# Patient Record
Sex: FEMALE | Race: WHITE | NOT HISPANIC OR LATINO | Employment: FULL TIME | URBAN - METROPOLITAN AREA
[De-identification: names, ages, dates, MRNs, and addresses within clinical notes are randomized per-mention and may not be internally consistent; named-entity substitution may affect disease eponyms.]

---

## 2019-09-20 ENCOUNTER — APPOINTMENT (OUTPATIENT)
Dept: GENERAL RADIOLOGY | Facility: HOSPITAL | Age: 29
End: 2019-09-20

## 2019-09-20 PROCEDURE — 74022 RADEX COMPL AQT ABD SERIES: CPT | Performed by: FAMILY MEDICINE

## 2019-09-21 ENCOUNTER — HOSPITAL ENCOUNTER (EMERGENCY)
Facility: HOSPITAL | Age: 29
Discharge: HOME | End: 2019-09-21
Attending: EMERGENCY MEDICINE | Admitting: EMERGENCY MEDICINE

## 2019-09-21 ENCOUNTER — APPOINTMENT (OUTPATIENT)
Dept: CT IMAGING | Facility: HOSPITAL | Age: 29
End: 2019-09-21

## 2019-09-21 VITALS
OXYGEN SATURATION: 100 % | BODY MASS INDEX: 23.05 KG/M2 | HEIGHT: 64 IN | RESPIRATION RATE: 16 BRPM | SYSTOLIC BLOOD PRESSURE: 114 MMHG | WEIGHT: 135 LBS | TEMPERATURE: 98.7 F | HEART RATE: 71 BPM | DIASTOLIC BLOOD PRESSURE: 75 MMHG

## 2019-09-21 DIAGNOSIS — R10.84 GENERALIZED ABDOMINAL PAIN: Primary | ICD-10-CM

## 2019-09-21 DIAGNOSIS — R19.7 NAUSEA VOMITING AND DIARRHEA: ICD-10-CM

## 2019-09-21 DIAGNOSIS — R11.2 NAUSEA VOMITING AND DIARRHEA: ICD-10-CM

## 2019-09-21 LAB
ALBUMIN SERPL-MCNC: 4 G/DL (ref 3.5–5.2)
ALBUMIN/GLOB SERPL: 1.4 G/DL
ALP SERPL-CCNC: 43 U/L (ref 39–117)
ALT SERPL W P-5'-P-CCNC: 31 U/L (ref 1–33)
AMYLASE SERPL-CCNC: 80 U/L (ref 28–100)
ANION GAP SERPL CALCULATED.3IONS-SCNC: 9.3 MMOL/L (ref 5–15)
AST SERPL-CCNC: 34 U/L (ref 1–32)
B-HCG UR QL: NEGATIVE
BASOPHILS # BLD AUTO: 0.03 10*3/MM3 (ref 0–0.2)
BASOPHILS NFR BLD AUTO: 0.5 % (ref 0–1.5)
BILIRUB SERPL-MCNC: 0.3 MG/DL (ref 0.2–1.2)
BILIRUB UR QL STRIP: NEGATIVE
BUN BLD-MCNC: 7 MG/DL (ref 6–20)
BUN/CREAT SERPL: 8.6 (ref 7–25)
CALCIUM SPEC-SCNC: 9.2 MG/DL (ref 8.6–10.5)
CHLORIDE SERPL-SCNC: 104 MMOL/L (ref 98–107)
CLARITY UR: CLEAR
CO2 SERPL-SCNC: 26.7 MMOL/L (ref 22–29)
COLOR UR: YELLOW
CREAT BLD-MCNC: 0.81 MG/DL (ref 0.57–1)
DEPRECATED RDW RBC AUTO: 42 FL (ref 37–54)
EOSINOPHIL # BLD AUTO: 0.11 10*3/MM3 (ref 0–0.4)
EOSINOPHIL NFR BLD AUTO: 1.9 % (ref 0.3–6.2)
ERYTHROCYTE [DISTWIDTH] IN BLOOD BY AUTOMATED COUNT: 12.6 % (ref 12.3–15.4)
GFR SERPL CREATININE-BSD FRML MDRD: 85 ML/MIN/1.73
GLOBULIN UR ELPH-MCNC: 2.9 GM/DL
GLUCOSE BLD-MCNC: 90 MG/DL (ref 65–99)
GLUCOSE UR STRIP-MCNC: NEGATIVE MG/DL
HCT VFR BLD AUTO: 39.1 % (ref 34–46.6)
HGB BLD-MCNC: 13.3 G/DL (ref 12–15.9)
HGB UR QL STRIP.AUTO: NEGATIVE
IMM GRANULOCYTES # BLD AUTO: 0.01 10*3/MM3 (ref 0–0.05)
IMM GRANULOCYTES NFR BLD AUTO: 0.2 % (ref 0–0.5)
KETONES UR QL STRIP: NEGATIVE
LEUKOCYTE ESTERASE UR QL STRIP.AUTO: NEGATIVE
LIPASE SERPL-CCNC: 79 U/L (ref 13–60)
LYMPHOCYTES # BLD AUTO: 2.88 10*3/MM3 (ref 0.7–3.1)
LYMPHOCYTES NFR BLD AUTO: 48.8 % (ref 19.6–45.3)
MCH RBC QN AUTO: 31.1 PG (ref 26.6–33)
MCHC RBC AUTO-ENTMCNC: 34 G/DL (ref 31.5–35.7)
MCV RBC AUTO: 91.4 FL (ref 79–97)
MONOCYTES # BLD AUTO: 0.39 10*3/MM3 (ref 0.1–0.9)
MONOCYTES NFR BLD AUTO: 6.6 % (ref 5–12)
NEUTROPHILS # BLD AUTO: 2.48 10*3/MM3 (ref 1.7–7)
NEUTROPHILS NFR BLD AUTO: 42 % (ref 42.7–76)
NITRITE UR QL STRIP: NEGATIVE
NRBC BLD AUTO-RTO: 0 /100 WBC (ref 0–0.2)
PH UR STRIP.AUTO: 7 [PH] (ref 4.5–8)
PLAT MORPH BLD: NORMAL
PLATELET # BLD AUTO: 295 10*3/MM3 (ref 140–450)
PMV BLD AUTO: 9.1 FL (ref 6–12)
POTASSIUM BLD-SCNC: 3.6 MMOL/L (ref 3.5–5.2)
PROT SERPL-MCNC: 6.9 G/DL (ref 6–8.5)
PROT UR QL STRIP: NEGATIVE
RBC # BLD AUTO: 4.28 10*6/MM3 (ref 3.77–5.28)
RBC MORPH BLD: NORMAL
SODIUM BLD-SCNC: 140 MMOL/L (ref 136–145)
SP GR UR STRIP: <=1.005 (ref 1–1.03)
UROBILINOGEN UR QL STRIP: NORMAL
WBC MORPH BLD: NORMAL
WBC NRBC COR # BLD: 5.9 10*3/MM3 (ref 3.4–10.8)

## 2019-09-21 PROCEDURE — 85025 COMPLETE CBC W/AUTO DIFF WBC: CPT | Performed by: EMERGENCY MEDICINE

## 2019-09-21 PROCEDURE — 81003 URINALYSIS AUTO W/O SCOPE: CPT | Performed by: EMERGENCY MEDICINE

## 2019-09-21 PROCEDURE — 85007 BL SMEAR W/DIFF WBC COUNT: CPT | Performed by: EMERGENCY MEDICINE

## 2019-09-21 PROCEDURE — 82150 ASSAY OF AMYLASE: CPT | Performed by: EMERGENCY MEDICINE

## 2019-09-21 PROCEDURE — 83690 ASSAY OF LIPASE: CPT | Performed by: EMERGENCY MEDICINE

## 2019-09-21 PROCEDURE — 81025 URINE PREGNANCY TEST: CPT | Performed by: EMERGENCY MEDICINE

## 2019-09-21 PROCEDURE — 80053 COMPREHEN METABOLIC PANEL: CPT | Performed by: EMERGENCY MEDICINE

## 2019-09-21 PROCEDURE — 99284 EMERGENCY DEPT VISIT MOD MDM: CPT | Performed by: EMERGENCY MEDICINE

## 2019-09-21 PROCEDURE — 74177 CT ABD & PELVIS W/CONTRAST: CPT

## 2019-09-21 PROCEDURE — 99283 EMERGENCY DEPT VISIT LOW MDM: CPT

## 2019-09-21 PROCEDURE — 0 IOPAMIDOL PER 1 ML: Performed by: EMERGENCY MEDICINE

## 2019-09-21 RX ORDER — SODIUM CHLORIDE 0.9 % (FLUSH) 0.9 %
10 SYRINGE (ML) INJECTION AS NEEDED
Status: DISCONTINUED | OUTPATIENT
Start: 2019-09-21 | End: 2019-09-22 | Stop reason: HOSPADM

## 2019-09-21 RX ADMIN — IOPAMIDOL 100 ML: 755 INJECTION, SOLUTION INTRAVENOUS at 21:43

## 2019-09-21 NOTE — ED NOTES
"The pt arrived to the ED ambulatory. Accompanied by her spouse. The pt reports that following meals she has n/v/d and abd pain. The pt reports the s/s have been ongoing x 1 week. The pt reports that she was seen at an urgent care and they told her she was \"full of poop\" The pt reports they prescribed her mag citrate but the pt reports she s/s persist.      Luz Jimenez RN  09/21/19 1942    "

## 2019-09-22 NOTE — ED PROVIDER NOTES
Subjective   Taryn Terry is a 30 yo WF who presents secondary abdominal pain, nausea and diarrhea x6 days.  Patient initially seen at urgent care center.  She was prescribed Zofran and Lomotil.  Patient reports no significant improvement with these medications.  She was seen at urgent care again yesterday.  X-rays were obtained which showed large amount of stool in the colon.  Patient was given a bottle of magnesium citrate.  This did not improve patient's symptoms.  She had several liquid stools.  Patient elected to come to the ER today for further evaluation.  Patient's pain appears to be exacerbated by eating.  Approximately 30 minutes after eating she will have increased pain, nausea and diarrhea.  No vomiting.  No fever.        History provided by:  Patient  Abdominal Pain   Pain location:  Generalized  Pain radiates to:  Does not radiate  Pain severity:  Moderate  Duration:  6 days  Timing:  Intermittent  Chronicity:  New  Context comment:  As described above.  Relieved by:  Nothing  Worsened by:  Eating  Associated symptoms: diarrhea and nausea    Associated symptoms: no chest pain, no constipation, no cough, no dysuria, no fever, no hematuria, no shortness of breath and no vomiting    Diarrhea:     Quality:  Watery    Duration:  6 days    Timing:  Intermittent  Nausea:     Severity:  Moderate  Risk factors: no alcohol abuse, has not had multiple surgeries, not obese, not pregnant and no recent hospitalization        Review of Systems   Constitutional: Negative.  Negative for fever.   HENT: Negative.  Negative for rhinorrhea.    Eyes: Negative.  Negative for redness.   Respiratory: Negative for cough and shortness of breath.    Cardiovascular: Negative for chest pain.   Gastrointestinal: Positive for abdominal pain, diarrhea and nausea. Negative for constipation and vomiting.   Endocrine: Negative.    Genitourinary: Negative.  Negative for difficulty urinating, dysuria and hematuria.   Musculoskeletal:  Negative.  Negative for back pain.   Skin: Negative.  Negative for color change.   Neurological: Negative.  Negative for syncope.   Hematological: Negative.    Psychiatric/Behavioral: Negative.    All other systems reviewed and are negative.      History reviewed. No pertinent past medical history.    No Known Allergies    History reviewed. No pertinent surgical history.    History reviewed. No pertinent family history.    Social History     Socioeconomic History   • Marital status:      Spouse name: Not on file   • Number of children: Not on file   • Years of education: Not on file   • Highest education level: Not on file   Tobacco Use   • Smoking status: Never Smoker   Substance and Sexual Activity   • Alcohol use: No   • Drug use: No   • Sexual activity: Defer           Objective   Physical Exam   Constitutional: She is oriented to person, place, and time. She appears well-developed and well-nourished. No distress.   29-year-old white female in the bed.  Patient appears in excellent overall health.  Vital signs are unremarkable.  Patient is friendly and cooperative.  Accompanied by her spouse.   HENT:   Head: Normocephalic and atraumatic.   Right Ear: External ear normal.   Left Ear: External ear normal.   Nose: Nose normal.   Mouth/Throat: Oropharynx is clear and moist.   Eyes: Conjunctivae and EOM are normal. Pupils are equal, round, and reactive to light.   Neck: Normal range of motion. Neck supple.   Cardiovascular: Normal rate, regular rhythm, normal heart sounds and intact distal pulses. Exam reveals no gallop and no friction rub.   No murmur heard.  Pulmonary/Chest: Effort normal and breath sounds normal.   Abdominal: Soft. Bowel sounds are normal. She exhibits no distension. There is no hepatosplenomegaly. There is tenderness (Minimal tenderness to palpation) in the right upper quadrant and epigastric area. There is no rigidity, no rebound, no guarding, no CVA tenderness, no tenderness at  McBurney's point and negative Yanez's sign. No hernia.   Musculoskeletal: Normal range of motion.   Neurological: She is alert and oriented to person, place, and time. She has normal reflexes.   Skin: Skin is warm and dry. She is not diaphoretic.   Psychiatric: She has a normal mood and affect. Her behavior is normal.   Nursing note and vitals reviewed.      Procedures           ED Course  ED Course as of Sep 22 0019   Sat Sep 21, 2019   1923 Patient's pain is right upper quadrant.  Pain and diarrhea began 30 minutes after eating.  Concerning for possible gallbladder etiology.  Obtain lab work and a CT with IV and oral contrast.  Patient declined offer for pain and nausea medicines at this time.  [SS]   2019 CBC and CMP are unremarkable.  Normal amylase.  Mildly elevated lipase.  Awaiting CT.  [SS]   2200 CT is unremarkable.  I find no source for patient's pain.  Will prescribe Bentyl for home.  Giving GI for follow-up.  [SS]   2223 I discussed with patient and spouse all results, diagnoses treatment and indications for follow-up.    Patient and spouse are going to [facility] in 2 weeks for vacation.  Discussed indications to be seen in [place] if symptoms worsen.  [SS]      ED Course User Index  [SS] Shyam Bunch MD      Labs Reviewed   COMPREHENSIVE METABOLIC PANEL - Abnormal; Notable for the following components:       Result Value    AST (SGOT) 34 (*)     All other components within normal limits    Narrative:     GFR Normal >60  Chronic Kidney Disease <60  Kidney Failure <15   LIPASE - Abnormal; Notable for the following components:    Lipase 79 (*)     All other components within normal limits   CBC WITH AUTO DIFFERENTIAL - Abnormal; Notable for the following components:    Neutrophil % 42.0 (*)     Lymphocyte % 48.8 (*)     All other components within normal limits   PREGNANCY, URINE - Normal   URINALYSIS W/ MICROSCOPIC IF INDICATED (NO CULTURE) - Normal    Narrative:     Urine microscopic not  indicated.   AMYLASE - Normal   SCAN SLIDE - Normal   CBC AND DIFFERENTIAL    Narrative:     The following orders were created for panel order CBC & Differential.  Procedure                               Abnormality         Status                     ---------                               -----------         ------                     CBC Auto Differential[[id number]]         Abnormal            Final result                 Please view results for these tests on the individual orders.     Xr Abdomen 2 View With Chest 1 View    Result Date: 9/20/2019  Narrative: TWO VIEWS ABDOMEN AND SINGLE VIEW CHEST  HISTORY: 29-year-old female with a history of abdominal pain with diarrhea for 1 week.  FINDINGS: Upright PA chest with supine and upright AP abdominal radiographs were obtained and demonstrate lungs that are normal in volume and are clear. The cardiomediastinal silhouette and pulmonary vasculature appear normal. Moderate retention of stool is seen throughout the colon, particularly within the descending colon.      Impression: There is moderate retention of stool in the colon as described. No evidence for an abnormality of the chest is appreciated.  This report was finalized on 9/20/2019 8:28 PM by Dr. Orion Guillermo M.D.      Ct Abdomen Pelvis With Contrast    Result Date: 9/21/2019  Narrative: CT Abdomen Pelvis W INDICATION: Right lower quadrant pain began one week ago TECHNIQUE: CT of the abdomen and pelvis with contrast. Coronal and sagittal reconstructions were obtained.  Radiation dose reduction techniques included automated exposure control or exposure modulation based on body size. Radiation audit for number of CT and nuclear cardiology exams performed in the last year: 0.  COMPARISON: None available. FINDINGS: Visualized lung bases are unremarkable. Abdomen: The liver and gallbladder and spleen and pancreas are normal. The kidneys and adrenal glands are normal. The aorta is normal in caliber. There is no  bowel obstruction, mass or adenopathy. Pelvis:  The appendix is normal. There is no pelvic mass or inflammatory change or abnormal fluid collection. There is no hernia or bowel obstruction. There is no acute bony abnormality.     Impression: Normal, negative CT of the abdomen and pelvis. Normal appendix. Signer Name: Naga Zarco MD  Signed: 9/21/2019 9:53 PM  Workstation Name: [facility]  [facility]    My differential diagnosis for abdominal pain includes but is not limited to:  Gastritis, gastroenteritis, peptic ulcer disease, GERD, esophageal perforation, acute appendicitis, mesenteric adenitis, Meckel’s diverticulum, epiploic appendagitis, diverticulitis, colon cancer, ulcerative colitis, Crohn’s disease, intussusception, small bowel obstruction, adhesions, ischemic bowel, perforated viscus, ileus, obstipation, biliary colic, cholecystitis, cholelithiasis, Jesus-Severiano Adonay, hepatitis, pancreatitis, common bile duct obstruction, cholangitis, bile leak, splenic trauma, splenic rupture, splenic infarction, splenic abscess, abdominal abscess, ascites, spontaneous bacterial peritonitis, hernia, UTI, cystitis, prostatitis, ureterolithiasis, urinary obstruction, ovarian cyst, torsion, pregnancy, ectopic pregnancy, PID, pelvic abscess, mittelschmerz, endometriosis, AAA, myocardial infarction, pneumonia, cancer, porphyria, DKA, medications, sickle cell, viral syndrome, zoster              MDM  Number of Diagnoses or Management Options  Generalized abdominal pain: new and requires workup  Nausea vomiting and diarrhea: new and requires workup     Amount and/or Complexity of Data Reviewed  Clinical lab tests: reviewed and ordered  Tests in the radiology section of CPT®: reviewed and ordered    Risk of Complications, Morbidity, and/or Mortality  Presenting problems: moderate  Diagnostic procedures: high  Management options: low    Patient Progress  Patient progress: improved      Final  diagnoses:   Generalized abdominal pain   Nausea vomiting and diarrhea              Shyam Bunch MD  09/22/19 0019

## 2019-09-22 NOTE — DISCHARGE INSTRUCTIONS
Continue current medications.  Follow-up with PCP or gastroenterology as above.  Recommend outpatient right upper quadrant ultrasound in the near future to evaluate your gallbladder further.  Return to ED for worsening symptoms, medical emergencies.

## 2019-09-23 ENCOUNTER — OFFICE VISIT (OUTPATIENT)
Dept: SURGERY | Facility: CLINIC | Age: 29
End: 2019-09-23

## 2019-09-23 VITALS
BODY MASS INDEX: 23.39 KG/M2 | HEIGHT: 64 IN | WEIGHT: 137 LBS | DIASTOLIC BLOOD PRESSURE: 60 MMHG | SYSTOLIC BLOOD PRESSURE: 106 MMHG

## 2019-09-23 DIAGNOSIS — R19.7 DIARRHEA, UNSPECIFIED TYPE: ICD-10-CM

## 2019-09-23 DIAGNOSIS — R10.11 RIGHT UPPER QUADRANT ABDOMINAL PAIN: Primary | ICD-10-CM

## 2019-09-23 PROCEDURE — 99202 OFFICE O/P NEW SF 15 MIN: CPT | Performed by: SURGERY

## 2019-09-23 NOTE — PROGRESS NOTES
PATIENT INFORMATION  Taryn Terry       - 1990    CHIEF COMPLAINT  Chief Complaint   Patient presents with   • Abdominal Pain       HISTORY OF PRESENT ILLNESS  HPI she complains of several week history of primarily mid to upper abdominal pain this is worse with eating.  No particular food sets it off.  After eating she has diarrhea as well.  She went to the urgent care center in the put her on an antiemetic and Imodium.  She went to the emergency room and had a CT scan of the abdomen and blood work.  She has not had any travel outside the country she has had an antibiotic for another reason within the last several months.  She is unsure if she is has any fevers or chills.  She says the diarrhea is light yellow        REVIEW OF SYSTEMS  Review of Systems otherwise negative      ACTIVE PROBLEMS  There are no active problems to display for this patient.        PAST MEDICAL HISTORY  History reviewed. No pertinent past medical history.      SURGICAL HISTORY  Past Surgical History:   Procedure Laterality Date   • BREAST SURGERY Left 2012    excision left breast mass   • SINUS SURGERY           FAMILY HISTORY  Family History   Problem Relation Age of Onset   • Heart disease Father    • Cancer Maternal Grandmother          SOCIAL HISTORY  Social History     Occupational History   • Not on file   Tobacco Use   • Smoking status: Never Smoker   Substance and Sexual Activity   • Alcohol use: No   • Drug use: No   • Sexual activity: Defer       Debilities/Disabilities Identified: None    Emotional Behavior: Appropriate    CURRENT MEDICATIONS    Current Outpatient Medications:   •  diphenoxylate-atropine (LOMOTIL) 2.5-0.025 MG per tablet, Take 1 tablet by mouth 4 (Four) Times a Day As Needed for Diarrhea., Disp: 20 tablet, Rfl: 0  •  norethindrone-ethinyl estradiol (FEMHRT /) 1-5 MG-MCG tablet, Take  by mouth daily., Disp: , Rfl:   •  ondansetron ODT (ZOFRAN-ODT) 8 MG disintegrating tablet, Take 1 tablet by mouth  "Every 8 (Eight) Hours As Needed for Nausea., Disp: 15 tablet, Rfl: 0  •  Prenatal Multivit-Min-Fe-FA (PRE- PO), Take  by mouth Daily., Disp: , Rfl:   •  valACYclovir (VALTREX) 1000 MG tablet, 1 po q8, Disp: 21 tablet, Rfl: 0    ALLERGIES  Patient has no known allergies.    VITALS  Vitals:    19 1555   BP: 106/60   Weight: 62.1 kg (137 lb)   Height: 162.6 cm (64\")       LAST RESULTS   Admission on 2019, Discharged on 2019   Component Date Value Ref Range Status   • Glucose 2019 90  65 - 99 mg/dL Final   • BUN 2019 7  6 - 20 mg/dL Final   • Creatinine 2019 0.81  0.57 - 1.00 mg/dL Final   • Sodium 2019 140  136 - 145 mmol/L Final   • Potassium 2019 3.6  3.5 - 5.2 mmol/L Final   • Chloride 2019 104  98 - 107 mmol/L Final   • CO2 2019 26.7  22.0 - 29.0 mmol/L Final   • Calcium 2019 9.2  8.6 - 10.5 mg/dL Final   • Total Protein 2019 6.9  6.0 - 8.5 g/dL Final   • Albumin 2019 4.00  3.50 - 5.20 g/dL Final   • ALT (SGPT) 2019 31  1 - 33 U/L Final   • AST (SGOT) 2019 34* 1 - 32 U/L Final   • Alkaline Phosphatase 2019 43  39 - 117 U/L Final   • Total Bilirubin 2019 0.3  0.2 - 1.2 mg/dL Final   • eGFR Non African Amer 2019 85  >60 mL/min/1.73 Final   • Globulin 2019 2.9  gm/dL Final   • A/G Ratio 2019 1.4  g/dL Final   • BUN/Creatinine Ratio 2019 8.6  7.0 - 25.0 Final   • Anion Gap 2019 9.3  5.0 - 15.0 mmol/L Final   • HCG, Urine QL 2019 Negative  Negative Final   • Color, UA 2019 Yellow  Yellow, Straw Final   • Appearance, UA 2019 Clear  Clear Final   • pH, UA 2019 7.0  4.5 - 8.0 Final   • Specific Gravity, UA 2019 <=1.005  1.003 - 1.030 Final   • Glucose, UA 2019 Negative  Negative Final   • Ketones, UA 2019 Negative  Negative Final   • Bilirubin, UA 2019 Negative  Negative Final   • Blood, UA 2019 Negative  Negative Final   • Protein, UA " 09/21/2019 Negative  Negative Final   • Leuk Esterase, UA 09/21/2019 Negative  Negative Final   • Nitrite, UA 09/21/2019 Negative  Negative Final   • Urobilinogen, UA 09/21/2019 0.2 E.U./dL  0.2 - 1.0 E.U./dL Final   • Lipase 09/21/2019 79* 13 - 60 U/L Final   • Amylase 09/21/2019 80  28 - 100 U/L Final   • WBC 09/21/2019 5.90  3.40 - 10.80 10*3/mm3 Final   • RBC 09/21/2019 4.28  3.77 - 5.28 10*6/mm3 Final   • Hemoglobin 09/21/2019 13.3  12.0 - 15.9 g/dL Final   • Hematocrit 09/21/2019 39.1  34.0 - 46.6 % Final   • MCV 09/21/2019 91.4  79.0 - 97.0 fL Final   • MCH 09/21/2019 31.1  26.6 - 33.0 pg Final   • MCHC 09/21/2019 34.0  31.5 - 35.7 g/dL Final   • RDW 09/21/2019 12.6  12.3 - 15.4 % Final   • RDW-SD 09/21/2019 42.0  37.0 - 54.0 fl Final   • MPV 09/21/2019 9.1  6.0 - 12.0 fL Final   • Platelets 09/21/2019 295  140 - 450 10*3/mm3 Final   • Neutrophil % 09/21/2019 42.0* 42.7 - 76.0 % Final   • Lymphocyte % 09/21/2019 48.8* 19.6 - 45.3 % Final   • Monocyte % 09/21/2019 6.6  5.0 - 12.0 % Final   • Eosinophil % 09/21/2019 1.9  0.3 - 6.2 % Final   • Basophil % 09/21/2019 0.5  0.0 - 1.5 % Final   • Immature Grans % 09/21/2019 0.2  0.0 - 0.5 % Final   • Neutrophils, Absolute 09/21/2019 2.48  1.70 - 7.00 10*3/mm3 Final   • Lymphocytes, Absolute 09/21/2019 2.88  0.70 - 3.10 10*3/mm3 Final   • Monocytes, Absolute 09/21/2019 0.39  0.10 - 0.90 10*3/mm3 Final   • Eosinophils, Absolute 09/21/2019 0.11  0.00 - 0.40 10*3/mm3 Final   • Basophils, Absolute 09/21/2019 0.03  0.00 - 0.20 10*3/mm3 Final   • Immature Grans, Absolute 09/21/2019 0.01  0.00 - 0.05 10*3/mm3 Final   • nRBC 09/21/2019 0.0  0.0 - 0.2 /100 WBC Final   • RBC Morphology 09/21/2019 Normal  Normal Final   • WBC Morphology 09/21/2019 Normal  Normal Final   • Platelet Morphology 09/21/2019 Normal  Normal Final     Xr Abdomen 2 View With Chest 1 View    Result Date: 9/20/2019  Narrative: TWO VIEWS ABDOMEN AND SINGLE VIEW CHEST  HISTORY: 29-year-old female with a  history of abdominal pain with diarrhea for 1 week.  FINDINGS: Upright PA chest with supine and upright AP abdominal radiographs were obtained and demonstrate lungs that are normal in volume and are clear. The cardiomediastinal silhouette and pulmonary vasculature appear normal. Moderate retention of stool is seen throughout the colon, particularly within the descending colon.      Impression: There is moderate retention of stool in the colon as described. No evidence for an abnormality of the chest is appreciated.  This report was finalized on 9/20/2019 8:28 PM by Dr. Orion Guillermo M.D.      Ct Abdomen Pelvis With Contrast    Result Date: 9/21/2019  Narrative: CT Abdomen Pelvis W INDICATION: Right lower quadrant pain began one week ago TECHNIQUE: CT of the abdomen and pelvis with contrast. Coronal and sagittal reconstructions were obtained.  Radiation dose reduction techniques included automated exposure control or exposure modulation based on body size. Radiation audit for number of CT and nuclear cardiology exams performed in the last year: 0.  COMPARISON: None available. FINDINGS: Visualized lung bases are unremarkable. Abdomen: The liver and gallbladder and spleen and pancreas are normal. The kidneys and adrenal glands are normal. The aorta is normal in caliber. There is no bowel obstruction, mass or adenopathy. Pelvis:  The appendix is normal. There is no pelvic mass or inflammatory change or abnormal fluid collection. There is no hernia or bowel obstruction. There is no acute bony abnormality.     Impression: Normal, negative CT of the abdomen and pelvis. Normal appendix. Signer Name: Naga Zarco MD  Signed: 9/21/2019 9:53 PM  Workstation Name: [facility]  [facility]      PHYSICAL EXAM  Physical Exam alert white female in no active distress she is oriented x3 her gait is normal.  Her abdomen is soft with some mild to subjective right upper quadrant tenderness.  There is no  abdominal mass.  I reviewed her CT scan films myself and it was essentially normal.  There was no thickening of the gallbladder there was no gallstones.  Her lipase was 79 but does not meet criteria for pancreatitis.  Her liver functions were essentially normal.  Viewed Dr. Bunch's note from the ER.    ASSESSMENT  Abdominal pain, diarrhea      PLAN  We will check a stool for C. difficile check an ultrasound of her gallbladder if the ultrasound is normal we will check a Kinevac stimulation HIDA scan.  I will see her back after those studies have been completed.  Of note she does have a trip planned to [place] in 2 weeks

## 2019-09-27 ENCOUNTER — HOSPITAL ENCOUNTER (OUTPATIENT)
Dept: ULTRASOUND IMAGING | Facility: HOSPITAL | Age: 29
Discharge: HOME | End: 2019-09-27
Admitting: SURGERY

## 2019-09-27 ENCOUNTER — HOSPITAL ENCOUNTER (OUTPATIENT)
Dept: NUCLEAR MEDICINE | Facility: HOSPITAL | Age: 29
Discharge: HOME | End: 2019-09-27

## 2019-09-27 DIAGNOSIS — R10.11 RIGHT UPPER QUADRANT ABDOMINAL PAIN: ICD-10-CM

## 2019-09-27 LAB — C DIFF GDH STL QL: NEGATIVE

## 2019-09-27 PROCEDURE — 87449 NOS EACH ORGANISM AG IA: CPT | Performed by: SURGERY

## 2019-09-27 PROCEDURE — 76705 ECHO EXAM OF ABDOMEN: CPT

## 2019-09-27 PROCEDURE — 87324 CLOSTRIDIUM AG IA: CPT | Performed by: SURGERY

## 2019-09-27 PROCEDURE — A9537 TC99M MEBROFENIN: HCPCS | Performed by: SURGERY

## 2019-09-27 PROCEDURE — 25010000002 SINCALIDE PER 5 MCG: Performed by: SURGERY

## 2019-09-27 PROCEDURE — 0 TECHNETIUM TC 99M MEBROFENIN KIT: Performed by: SURGERY

## 2019-09-27 PROCEDURE — 78227 HEPATOBIL SYST IMAGE W/DRUG: CPT

## 2019-09-27 RX ORDER — KIT FOR THE PREPARATION OF TECHNETIUM TC 99M MEBROFENIN 45 MG/10ML
1 INJECTION, POWDER, LYOPHILIZED, FOR SOLUTION INTRAVENOUS
Status: COMPLETED | OUTPATIENT
Start: 2019-09-27 | End: 2019-09-27

## 2019-09-27 RX ADMIN — MEBROFENIN 1 DOSE: 45 INJECTION, POWDER, LYOPHILIZED, FOR SOLUTION INTRAVENOUS at 14:05

## 2019-09-27 RX ADMIN — SINCALIDE 1.2 MCG: 5 INJECTION, POWDER, LYOPHILIZED, FOR SOLUTION INTRAVENOUS at 15:00

## 2019-09-30 ENCOUNTER — OFFICE VISIT (OUTPATIENT)
Dept: SURGERY | Facility: CLINIC | Age: 29
End: 2019-09-30

## 2019-09-30 VITALS
RESPIRATION RATE: 18 BRPM | BODY MASS INDEX: 23.22 KG/M2 | WEIGHT: 136 LBS | HEIGHT: 64 IN | DIASTOLIC BLOOD PRESSURE: 70 MMHG | SYSTOLIC BLOOD PRESSURE: 122 MMHG

## 2019-09-30 DIAGNOSIS — K80.20 CALCULUS OF GALLBLADDER WITHOUT CHOLECYSTITIS WITHOUT OBSTRUCTION: Primary | ICD-10-CM

## 2019-09-30 PROCEDURE — 99212 OFFICE O/P EST SF 10 MIN: CPT | Performed by: SURGERY

## 2019-09-30 NOTE — PROGRESS NOTES
PATIENT INFORMATION  Taryn Terry       - 1990    CHIEF COMPLAINT  Chief Complaint   Patient presents with   • Abdominal Pain   FU abdominal pain, U/Sand HIDA comp 19    HISTORY OF PRESENT ILLNESS  HPI she complains of ongoing right upper quadrant pain with nausea she says the diarrhea has improved she denies any vomiting.  She did have a fullness in her right upper quadrant when they did the injection for the Kinevac study.  She returns today for the results of her ultrasound and HIDA scan.        REVIEW OF SYSTEMS  Review of Systems her and her  are actively trying to conceive other systems were reviewed and are negative      ACTIVE PROBLEMS  There are no active problems to display for this patient.        PAST MEDICAL HISTORY  History reviewed. No pertinent past medical history.      SURGICAL HISTORY  Past Surgical History:   Procedure Laterality Date   • BREAST SURGERY Left 2012    excision left breast mass   • SINUS SURGERY           FAMILY HISTORY  Family History   Problem Relation Age of Onset   • Heart disease Father    • Cancer Maternal Grandmother          SOCIAL HISTORY  Social History     Occupational History   • Not on file   Tobacco Use   • Smoking status: Never Smoker   • Smokeless tobacco: Never Used   Substance and Sexual Activity   • Alcohol use: No   • Drug use: No   • Sexual activity: Defer       Debilities/Disabilities Identified: None    Emotional Behavior: Appropriate    CURRENT MEDICATIONS    Current Outpatient Medications:   •  diphenoxylate-atropine (LOMOTIL) 2.5-0.025 MG per tablet, Take 1 tablet by mouth 4 (Four) Times a Day As Needed for Diarrhea., Disp: 20 tablet, Rfl: 0  •  norethindrone-ethinyl estradiol (FEMHRT /) 1-5 MG-MCG tablet, Take  by mouth daily., Disp: , Rfl:   •  ondansetron ODT (ZOFRAN-ODT) 8 MG disintegrating tablet, Take 1 tablet by mouth Every 8 (Eight) Hours As Needed for Nausea., Disp: 15 tablet, Rfl: 0  •  Prenatal Multivit-Min-Fe-FA  "(PRE- PO), Take  by mouth Daily., Disp: , Rfl:   •  valACYclovir (VALTREX) 1000 MG tablet, 1 po q8, Disp: 21 tablet, Rfl: 0    ALLERGIES  Patient has no known allergies.    VITALS  Vitals:    19 1355   BP: 122/70   Resp: 18   Weight: 61.7 kg (136 lb)   Height: 162.6 cm (64\")       LAST RESULTS   Hospital Outpatient Visit on 2019   Component Date Value Ref Range Status   • C Diff GDH / Toxin 2019 Negative  Negative Final     Xr Abdomen 2 View With Chest 1 View    Result Date: 2019  Narrative: TWO VIEWS ABDOMEN AND SINGLE VIEW CHEST  HISTORY: 29-year-old female with a history of abdominal pain with diarrhea for 1 week.  FINDINGS: Upright PA chest with supine and upright AP abdominal radiographs were obtained and demonstrate lungs that are normal in volume and are clear. The cardiomediastinal silhouette and pulmonary vasculature appear normal. Moderate retention of stool is seen throughout the colon, particularly within the descending colon.      Impression: There is moderate retention of stool in the colon as described. No evidence for an abnormality of the chest is appreciated.  This report was finalized on 2019 8:28 PM by Dr. Orion Guillermo M.D.      Ct Abdomen Pelvis With Contrast    Result Date: 2019  Narrative: CT Abdomen Pelvis W INDICATION: Right lower quadrant pain began one week ago TECHNIQUE: CT of the abdomen and pelvis with contrast. Coronal and sagittal reconstructions were obtained.  Radiation dose reduction techniques included automated exposure control or exposure modulation based on body size. Radiation audit for number of CT and nuclear cardiology exams performed in the last year: 0.  COMPARISON: None available. FINDINGS: Visualized lung bases are unremarkable. Abdomen: The liver and gallbladder and spleen and pancreas are normal. The kidneys and adrenal glands are normal. The aorta is normal in caliber. There is no bowel obstruction, mass or adenopathy. Pelvis: "  The appendix is normal. There is no pelvic mass or inflammatory change or abnormal fluid collection. There is no hernia or bowel obstruction. There is no acute bony abnormality.     Impression: Normal, negative CT of the abdomen and pelvis. Normal appendix. Signer Name: Naga Zarco MD  Signed: 9/21/2019 9:53 PM  Workstation Name: [email or web address]  [facility] Gall[facility]    Result Date: 9/27/2019  Narrative: ULTRASOUND ABDOMEN, LIMITED, 09/27/2019  HISTORY: 29-year-old female with 2 week history right upper quadrant abdomen pain.  TECHNIQUE: Grayscale ultrasound imaging of the right upper quadrant was performed.  FINDINGS: The gallbladder is normal in ultrasound appearance with the exception of a small amount of biliary sludge within the dependent portion of the gallbladder. There is no visible cholelithiasis, gallbladder wall thickening or adjacent fluid collection. There is no intrahepatic or extra hepatic bile duct dilatation (CBD 3 mm).  The liver is normal in size and appearance. Visualized pancreas is negative. Right kidney is negative with no hydronephrosis.      Impression: Negative gallbladder ultrasound examination.  This report was finalized on 9/27/2019 2:17 PM by Dr. Flakito Schroeder MD.      Nm Hida Scan With Pharmacological Intervention    Result Date: 9/27/2019  Narrative: HEPATOBILIARY SCAN WITH GALLBLADDER EF MEASUREMENT, 09/27/2019  HISTORY: 29-year-old female complaining of 2 week history right upper quadrant abdomen pain, nausea and diarrhea. She notes symptoms are worse after meals. Negative gallbladder ultrasound examination today.  DOSE: *  5.5 mCi technetium labeled Choletec. *  1.22 mcg CCK infusion at 60 minutes.  COMPARISON: Ultrasound gallbladder, 09/27/2019.  FINDINGS: There is normal, prompt visualization of biliary activity within the gallbladder and bile ducts at 20 minutes, and there is increasing gallbladder and small bowel activity at 40 minutes  and 60 minutes. There is no evidence of cholecystitis or bile duct obstruction.  Normal gallbladder contraction is demonstrated during CCK infusion. Ejection fraction measures 87% (normal gallbladder EF measures greater than 35% using this protocol).      Impression: 1. Normal hepatobiliary scan. No evidence of cholecystitis or bile duct obstruction. 2. Normal gallbladder contraction with CCK. Ejection fraction measures 87%.  This report was finalized on 9/27/2019 3:34 PM by Dr. Flakito Schroeder MD.        PHYSICAL EXAM  Physical Exam alert white female in no active distress she does not have any clinical jaundice her heart shows a regular rate and rhythm her lungs are clear and equal her gait is normal she is oriented x3 she has subjective right upper quadrant tenderness without mass.  Her ultrasound shows microlithiasis.  Her Kinevac stimulation HIDA scan was normal.  I reviewed that study myself    ASSESSMENT  Cholelithiasis      PLAN  The risk benefits and options were discussed with the patient in detail.  She wishes to proceed with a laparoscopic cholecystectomy she will call back tomorrow to tell me when she would like to proceed.  She called back and wants to proceed on October 7

## 2019-09-30 NOTE — H&P
PATIENT INFORMATION  Taryn Terry       - 1990    CHIEF COMPLAINT  Chief Complaint   Patient presents with   • Abdominal Pain   FU abdominal pain, U/Sand HIDA comp 19    HISTORY OF PRESENT ILLNESS  HPI she complains of ongoing right upper quadrant pain with nausea she says the diarrhea has improved she denies any vomiting.  She did have a fullness in her right upper quadrant when they did the injection for the Kinevac study.  She returns today for the results of her ultrasound and HIDA scan.        REVIEW OF SYSTEMS  Review of Systems her and her  are actively trying to conceive other systems were reviewed and are negative      ACTIVE PROBLEMS  There are no active problems to display for this patient.        PAST MEDICAL HISTORY  History reviewed. No pertinent past medical history.      SURGICAL HISTORY  Past Surgical History:   Procedure Laterality Date   • BREAST SURGERY Left 2012    excision left breast mass   • SINUS SURGERY           FAMILY HISTORY  Family History   Problem Relation Age of Onset   • Heart disease Father    • Cancer Maternal Grandmother          SOCIAL HISTORY  Social History     Occupational History   • Not on file   Tobacco Use   • Smoking status: Never Smoker   • Smokeless tobacco: Never Used   Substance and Sexual Activity   • Alcohol use: No   • Drug use: No   • Sexual activity: Defer       Debilities/Disabilities Identified: None    Emotional Behavior: Appropriate    CURRENT MEDICATIONS    Current Outpatient Medications:   •  diphenoxylate-atropine (LOMOTIL) 2.5-0.025 MG per tablet, Take 1 tablet by mouth 4 (Four) Times a Day As Needed for Diarrhea., Disp: 20 tablet, Rfl: 0  •  norethindrone-ethinyl estradiol (FEMHRT /) 1-5 MG-MCG tablet, Take  by mouth daily., Disp: , Rfl:   •  ondansetron ODT (ZOFRAN-ODT) 8 MG disintegrating tablet, Take 1 tablet by mouth Every 8 (Eight) Hours As Needed for Nausea., Disp: 15 tablet, Rfl: 0  •  Prenatal Multivit-Min-Fe-FA  "(PRE- PO), Take  by mouth Daily., Disp: , Rfl:   •  valACYclovir (VALTREX) 1000 MG tablet, 1 po q8, Disp: 21 tablet, Rfl: 0    ALLERGIES  Patient has no known allergies.    VITALS  Vitals:    19 1355   BP: 122/70   Resp: 18   Weight: 61.7 kg (136 lb)   Height: 162.6 cm (64\")       LAST RESULTS   Hospital Outpatient Visit on 2019   Component Date Value Ref Range Status   • C Diff GDH / Toxin 2019 Negative  Negative Final     Xr Abdomen 2 View With Chest 1 View    Result Date: 2019  Narrative: TWO VIEWS ABDOMEN AND SINGLE VIEW CHEST  HISTORY: 29-year-old female with a history of abdominal pain with diarrhea for 1 week.  FINDINGS: Upright PA chest with supine and upright AP abdominal radiographs were obtained and demonstrate lungs that are normal in volume and are clear. The cardiomediastinal silhouette and pulmonary vasculature appear normal. Moderate retention of stool is seen throughout the colon, particularly within the descending colon.      Impression: There is moderate retention of stool in the colon as described. No evidence for an abnormality of the chest is appreciated.  This report was finalized on 2019 8:28 PM by Dr. Orion Guillermo M.D.      Ct Abdomen Pelvis With Contrast    Result Date: 2019  Narrative: CT Abdomen Pelvis W INDICATION: Right lower quadrant pain began one week ago TECHNIQUE: CT of the abdomen and pelvis with contrast. Coronal and sagittal reconstructions were obtained.  Radiation dose reduction techniques included automated exposure control or exposure modulation based on body size. Radiation audit for number of CT and nuclear cardiology exams performed in the last year: 0.  COMPARISON: None available. FINDINGS: Visualized lung bases are unremarkable. Abdomen: The liver and gallbladder and spleen and pancreas are normal. The kidneys and adrenal glands are normal. The aorta is normal in caliber. There is no bowel obstruction, mass or adenopathy. Pelvis: "  The appendix is normal. There is no pelvic mass or inflammatory change or abnormal fluid collection. There is no hernia or bowel obstruction. There is no acute bony abnormality.     Impression: Normal, negative CT of the abdomen and pelvis. Normal appendix. Signer Name: Naga Zarco MD  Signed: 9/21/2019 9:53 PM  Workstation Name: [id number]  [facility] Gall[facility]adder    Result Date: 9/27/2019  Narrative: ULTRASOUND ABDOMEN, LIMITED, 09/27/2019  HISTORY: 29-year-old female with 2 week history right upper quadrant abdomen pain.  TECHNIQUE: Grayscale ultrasound imaging of the right upper quadrant was performed.  FINDINGS: The gallbladder is normal in ultrasound appearance with the exception of a small amount of biliary sludge within the dependent portion of the gallbladder. There is no visible cholelithiasis, gallbladder wall thickening or adjacent fluid collection. There is no intrahepatic or extra hepatic bile duct dilatation (CBD 3 mm).  The liver is normal in size and appearance. Visualized pancreas is negative. Right kidney is negative with no hydronephrosis.      Impression: Negative gallbladder ultrasound examination.  This report was finalized on 9/27/2019 2:17 PM by Dr. Flakito Schroeder MD.      Nm Hida Scan With Pharmacological Intervention    Result Date: 9/27/2019  Narrative: HEPATOBILIARY SCAN WITH GALLBLADDER EF MEASUREMENT, 09/27/2019  HISTORY: 29-year-old female complaining of 2 week history right upper quadrant abdomen pain, nausea and diarrhea. She notes symptoms are worse after meals. Negative gallbladder ultrasound examination today.  DOSE: *  5.5 mCi technetium labeled Choletec. *  1.22 mcg CCK infusion at 60 minutes.  COMPARISON: Ultrasound gallbladder, 09/27/2019.  FINDINGS: There is normal, prompt visualization of biliary activity within the gallbladder and bile ducts at 20 minutes, and there is increasing gallbladder and small bowel activity at 40 minutes  and 60 minutes. There is no evidence of cholecystitis or bile duct obstruction.  Normal gallbladder contraction is demonstrated during CCK infusion. Ejection fraction measures 87% (normal gallbladder EF measures greater than 35% using this protocol).      Impression: 1. Normal hepatobiliary scan. No evidence of cholecystitis or bile duct obstruction. 2. Normal gallbladder contraction with CCK. Ejection fraction measures 87%.  This report was finalized on 9/27/2019 3:34 PM by Dr. Flakito Schroeder MD.        PHYSICAL EXAM  Physical Exam alert white female in no active distress she does not have any clinical jaundice her heart shows a regular rate and rhythm her lungs are clear and equal her gait is normal she is oriented x3 she has subjective right upper quadrant tenderness without mass.  Her ultrasound shows microlithiasis.  Her Kinevac stimulation HIDA scan was normal.  I reviewed that study myself    ASSESSMENT  Cholelithiasis      PLAN  The risk benefits and options were discussed with the patient in detail.  She wishes to proceed with a laparoscopic cholecystectomy she will call back tomorrow to tell me when she would like to proceed.  She called back and wants to proceed on October 7

## 2019-10-03 ENCOUNTER — LAB (OUTPATIENT)
Dept: LAB | Facility: HOSPITAL | Age: 29
End: 2019-10-03

## 2019-10-03 DIAGNOSIS — K80.20 CALCULUS OF GALLBLADDER WITHOUT CHOLECYSTITIS WITHOUT OBSTRUCTION: ICD-10-CM

## 2019-10-03 PROCEDURE — 84703 CHORIONIC GONADOTROPIN ASSAY: CPT

## 2019-10-03 PROCEDURE — 36415 COLL VENOUS BLD VENIPUNCTURE: CPT

## 2019-10-04 ENCOUNTER — ANESTHESIA EVENT (OUTPATIENT)
Dept: PERIOP | Facility: HOSPITAL | Age: 29
End: 2019-10-04

## 2019-10-04 LAB — HCG SERPL QL: NEGATIVE

## 2019-10-07 ENCOUNTER — ANESTHESIA (OUTPATIENT)
Dept: PERIOP | Facility: HOSPITAL | Age: 29
End: 2019-10-07

## 2019-10-07 ENCOUNTER — HOSPITAL ENCOUNTER (OUTPATIENT)
Facility: HOSPITAL | Age: 29
Setting detail: HOSPITAL OUTPATIENT SURGERY
Discharge: HOME | End: 2019-10-07
Attending: SURGERY | Admitting: SURGERY

## 2019-10-07 VITALS
SYSTOLIC BLOOD PRESSURE: 112 MMHG | DIASTOLIC BLOOD PRESSURE: 69 MMHG | TEMPERATURE: 97 F | WEIGHT: 137.6 LBS | RESPIRATION RATE: 14 BRPM | BODY MASS INDEX: 23.49 KG/M2 | HEART RATE: 62 BPM | OXYGEN SATURATION: 97 % | HEIGHT: 64 IN

## 2019-10-07 DIAGNOSIS — K80.20 CALCULUS OF GALLBLADDER WITHOUT CHOLECYSTITIS WITHOUT OBSTRUCTION: ICD-10-CM

## 2019-10-07 PROCEDURE — 25010000002 FENTANYL CITRATE (PF) 100 MCG/2ML SOLUTION: Performed by: NURSE ANESTHETIST, CERTIFIED REGISTERED

## 2019-10-07 PROCEDURE — 25010000002 ONDANSETRON PER 1 MG: Performed by: ANESTHESIOLOGY

## 2019-10-07 PROCEDURE — 25010000002 MIDAZOLAM PER 1 MG: Performed by: ANESTHESIOLOGY

## 2019-10-07 PROCEDURE — 25010000002 SUCCINYLCHOLINE PER 20 MG: Performed by: NURSE ANESTHETIST, CERTIFIED REGISTERED

## 2019-10-07 PROCEDURE — 25010000002 PROPOFOL 10 MG/ML EMULSION: Performed by: NURSE ANESTHETIST, CERTIFIED REGISTERED

## 2019-10-07 PROCEDURE — 25010000002 HYDROMORPHONE PER 4 MG: Performed by: NURSE ANESTHETIST, CERTIFIED REGISTERED

## 2019-10-07 PROCEDURE — 25010000003 CEFAZOLIN SODIUM-DEXTROSE 2-3 GM-%(50ML) RECONSTITUTED SOLUTION: Performed by: SURGERY

## 2019-10-07 PROCEDURE — 88304 TISSUE EXAM BY PATHOLOGIST: CPT | Performed by: SURGERY

## 2019-10-07 PROCEDURE — 47562 LAPAROSCOPIC CHOLECYSTECTOMY: CPT | Performed by: SURGERY

## 2019-10-07 PROCEDURE — 25010000002 NEOSTIGMINE 10 MG/10ML SOLUTION: Performed by: NURSE ANESTHETIST, CERTIFIED REGISTERED

## 2019-10-07 PROCEDURE — 25010000002 DEXAMETHASONE PER 1 MG: Performed by: ANESTHESIOLOGY

## 2019-10-07 PROCEDURE — 25010000002 ONDANSETRON PER 1 MG: Performed by: NURSE ANESTHETIST, CERTIFIED REGISTERED

## 2019-10-07 PROCEDURE — 25010000002 PROMETHAZINE PER 50 MG: Performed by: NURSE ANESTHETIST, CERTIFIED REGISTERED

## 2019-10-07 PROCEDURE — 25010000002 KETOROLAC TROMETHAMINE PER 15 MG: Performed by: NURSE ANESTHETIST, CERTIFIED REGISTERED

## 2019-10-07 RX ORDER — LIDOCAINE HYDROCHLORIDE 20 MG/ML
INJECTION, SOLUTION INFILTRATION; PERINEURAL AS NEEDED
Status: DISCONTINUED | OUTPATIENT
Start: 2019-10-07 | End: 2019-10-07 | Stop reason: SURG

## 2019-10-07 RX ORDER — MORPHINE SULFATE 10 MG/ML
2 INJECTION INTRAMUSCULAR; INTRAVENOUS; SUBCUTANEOUS
Status: DISCONTINUED | OUTPATIENT
Start: 2019-10-07 | End: 2019-10-07 | Stop reason: HOSPADM

## 2019-10-07 RX ORDER — SODIUM CHLORIDE 0.9 % (FLUSH) 0.9 %
1-10 SYRINGE (ML) INJECTION AS NEEDED
Status: DISCONTINUED | OUTPATIENT
Start: 2019-10-07 | End: 2019-10-07 | Stop reason: HOSPADM

## 2019-10-07 RX ORDER — DEXAMETHASONE SODIUM PHOSPHATE 4 MG/ML
8 INJECTION, SOLUTION INTRA-ARTICULAR; INTRALESIONAL; INTRAMUSCULAR; INTRAVENOUS; SOFT TISSUE ONCE
Status: COMPLETED | OUTPATIENT
Start: 2019-10-07 | End: 2019-10-07

## 2019-10-07 RX ORDER — FENTANYL CITRATE 50 UG/ML
INJECTION, SOLUTION INTRAMUSCULAR; INTRAVENOUS AS NEEDED
Status: DISCONTINUED | OUTPATIENT
Start: 2019-10-07 | End: 2019-10-07 | Stop reason: SURG

## 2019-10-07 RX ORDER — HYDROMORPHONE HYDROCHLORIDE 1 MG/ML
1 INJECTION, SOLUTION INTRAMUSCULAR; INTRAVENOUS; SUBCUTANEOUS
Status: DISCONTINUED | OUTPATIENT
Start: 2019-10-07 | End: 2019-10-07 | Stop reason: HOSPADM

## 2019-10-07 RX ORDER — SODIUM CHLORIDE, SODIUM LACTATE, POTASSIUM CHLORIDE, CALCIUM CHLORIDE 600; 310; 30; 20 MG/100ML; MG/100ML; MG/100ML; MG/100ML
9 INJECTION, SOLUTION INTRAVENOUS CONTINUOUS
Status: DISCONTINUED | OUTPATIENT
Start: 2019-10-07 | End: 2019-10-07 | Stop reason: HOSPADM

## 2019-10-07 RX ORDER — OXYCODONE HYDROCHLORIDE AND ACETAMINOPHEN 5; 325 MG/1; MG/1
1-2 TABLET ORAL EVERY 4 HOURS PRN
Qty: 30 TABLET | Refills: 0 | Status: SHIPPED | OUTPATIENT
Start: 2019-10-07 | End: 2019-10-14

## 2019-10-07 RX ORDER — CEFAZOLIN SODIUM 2 G/50ML
2 SOLUTION INTRAVENOUS ONCE
Status: COMPLETED | OUTPATIENT
Start: 2019-10-07 | End: 2019-10-07

## 2019-10-07 RX ORDER — PROPOFOL 10 MG/ML
VIAL (ML) INTRAVENOUS AS NEEDED
Status: DISCONTINUED | OUTPATIENT
Start: 2019-10-07 | End: 2019-10-07 | Stop reason: SURG

## 2019-10-07 RX ORDER — LIDOCAINE HYDROCHLORIDE 10 MG/ML
0.5 INJECTION, SOLUTION EPIDURAL; INFILTRATION; INTRACAUDAL; PERINEURAL ONCE AS NEEDED
Status: COMPLETED | OUTPATIENT
Start: 2019-10-07 | End: 2019-10-07

## 2019-10-07 RX ORDER — KETOROLAC TROMETHAMINE 30 MG/ML
INJECTION, SOLUTION INTRAMUSCULAR; INTRAVENOUS AS NEEDED
Status: DISCONTINUED | OUTPATIENT
Start: 2019-10-07 | End: 2019-10-07 | Stop reason: SURG

## 2019-10-07 RX ORDER — MIDAZOLAM HYDROCHLORIDE 1 MG/ML
2 INJECTION INTRAMUSCULAR; INTRAVENOUS
Status: DISCONTINUED | OUTPATIENT
Start: 2019-10-07 | End: 2019-10-07 | Stop reason: HOSPADM

## 2019-10-07 RX ORDER — MAGNESIUM HYDROXIDE 1200 MG/15ML
LIQUID ORAL AS NEEDED
Status: DISCONTINUED | OUTPATIENT
Start: 2019-10-07 | End: 2019-10-07 | Stop reason: HOSPADM

## 2019-10-07 RX ORDER — GLYCOPYRROLATE 0.2 MG/ML
INJECTION INTRAMUSCULAR; INTRAVENOUS AS NEEDED
Status: DISCONTINUED | OUTPATIENT
Start: 2019-10-07 | End: 2019-10-07 | Stop reason: SURG

## 2019-10-07 RX ORDER — SODIUM CHLORIDE, SODIUM LACTATE, POTASSIUM CHLORIDE, CALCIUM CHLORIDE 600; 310; 30; 20 MG/100ML; MG/100ML; MG/100ML; MG/100ML
100 INJECTION, SOLUTION INTRAVENOUS CONTINUOUS
Status: DISCONTINUED | OUTPATIENT
Start: 2019-10-07 | End: 2019-10-07 | Stop reason: HOSPADM

## 2019-10-07 RX ORDER — SCOLOPAMINE TRANSDERMAL SYSTEM 1 MG/1
1 PATCH, EXTENDED RELEASE TRANSDERMAL CONTINUOUS
Status: DISCONTINUED | OUTPATIENT
Start: 2019-10-07 | End: 2019-10-07 | Stop reason: HOSPADM

## 2019-10-07 RX ORDER — NEOSTIGMINE METHYLSULFATE 1 MG/ML
INJECTION, SOLUTION INTRAVENOUS AS NEEDED
Status: DISCONTINUED | OUTPATIENT
Start: 2019-10-07 | End: 2019-10-07 | Stop reason: SURG

## 2019-10-07 RX ORDER — MIDAZOLAM HYDROCHLORIDE 1 MG/ML
1 INJECTION INTRAMUSCULAR; INTRAVENOUS
Status: DISCONTINUED | OUTPATIENT
Start: 2019-10-07 | End: 2019-10-07 | Stop reason: HOSPADM

## 2019-10-07 RX ORDER — SUCCINYLCHOLINE CHLORIDE 20 MG/ML
INJECTION INTRAMUSCULAR; INTRAVENOUS AS NEEDED
Status: DISCONTINUED | OUTPATIENT
Start: 2019-10-07 | End: 2019-10-07 | Stop reason: SURG

## 2019-10-07 RX ORDER — ONDANSETRON 2 MG/ML
4 INJECTION INTRAMUSCULAR; INTRAVENOUS ONCE AS NEEDED
Status: COMPLETED | OUTPATIENT
Start: 2019-10-07 | End: 2019-10-07

## 2019-10-07 RX ORDER — ACETAMINOPHEN 500 MG
1000 TABLET ORAL ONCE
Status: COMPLETED | OUTPATIENT
Start: 2019-10-07 | End: 2019-10-07

## 2019-10-07 RX ORDER — PROMETHAZINE HYDROCHLORIDE 25 MG/1
25 TABLET ORAL EVERY 4 HOURS PRN
Qty: 20 TABLET | Refills: 0 | Status: SHIPPED | OUTPATIENT
Start: 2019-10-07 | End: 2019-11-03

## 2019-10-07 RX ORDER — FAMOTIDINE 20 MG/1
20 TABLET, FILM COATED ORAL
Status: COMPLETED | OUTPATIENT
Start: 2019-10-07 | End: 2019-10-07

## 2019-10-07 RX ORDER — SODIUM CHLORIDE 0.9 % (FLUSH) 0.9 %
3 SYRINGE (ML) INJECTION EVERY 12 HOURS SCHEDULED
Status: DISCONTINUED | OUTPATIENT
Start: 2019-10-07 | End: 2019-10-07 | Stop reason: HOSPADM

## 2019-10-07 RX ORDER — OXYCODONE HYDROCHLORIDE AND ACETAMINOPHEN 5; 325 MG/1; MG/1
1 TABLET ORAL ONCE AS NEEDED
Status: COMPLETED | OUTPATIENT
Start: 2019-10-07 | End: 2019-10-07

## 2019-10-07 RX ORDER — HYDROMORPHONE HCL 110MG/55ML
PATIENT CONTROLLED ANALGESIA SYRINGE INTRAVENOUS AS NEEDED
Status: DISCONTINUED | OUTPATIENT
Start: 2019-10-07 | End: 2019-10-07 | Stop reason: SURG

## 2019-10-07 RX ORDER — BUPIVACAINE HYDROCHLORIDE AND EPINEPHRINE 2.5; 5 MG/ML; UG/ML
INJECTION, SOLUTION INFILTRATION; PERINEURAL AS NEEDED
Status: DISCONTINUED | OUTPATIENT
Start: 2019-10-07 | End: 2019-10-07 | Stop reason: HOSPADM

## 2019-10-07 RX ORDER — SODIUM CHLORIDE 9 MG/ML
40 INJECTION, SOLUTION INTRAVENOUS AS NEEDED
Status: DISCONTINUED | OUTPATIENT
Start: 2019-10-07 | End: 2019-10-07 | Stop reason: HOSPADM

## 2019-10-07 RX ORDER — ROCURONIUM BROMIDE 10 MG/ML
INJECTION, SOLUTION INTRAVENOUS AS NEEDED
Status: DISCONTINUED | OUTPATIENT
Start: 2019-10-07 | End: 2019-10-07 | Stop reason: SURG

## 2019-10-07 RX ADMIN — SODIUM CHLORIDE, POTASSIUM CHLORIDE, SODIUM LACTATE AND CALCIUM CHLORIDE 9 ML/HR: 600; 310; 30; 20 INJECTION, SOLUTION INTRAVENOUS at 08:05

## 2019-10-07 RX ADMIN — HYDROMORPHONE HYDROCHLORIDE 1 MG: 1 INJECTION, SOLUTION INTRAMUSCULAR; INTRAVENOUS; SUBCUTANEOUS at 10:28

## 2019-10-07 RX ADMIN — ROCURONIUM BROMIDE 20 MG: 10 INJECTION INTRAVENOUS at 09:17

## 2019-10-07 RX ADMIN — FAMOTIDINE 20 MG: 10 INJECTION, SOLUTION INTRAVENOUS at 08:29

## 2019-10-07 RX ADMIN — GLYCOPYRROLATE 0.6 MG: 0.2 INJECTION INTRAMUSCULAR; INTRAVENOUS at 09:58

## 2019-10-07 RX ADMIN — PROPOFOL 180 MG: 10 INJECTION, EMULSION INTRAVENOUS at 09:09

## 2019-10-07 RX ADMIN — NEOSTIGMINE METHYLSULFATE 3.5 MG: 1 INJECTION INTRAVENOUS at 09:58

## 2019-10-07 RX ADMIN — SODIUM CHLORIDE, POTASSIUM CHLORIDE, SODIUM LACTATE AND CALCIUM CHLORIDE: 600; 310; 30; 20 INJECTION, SOLUTION INTRAVENOUS at 10:18

## 2019-10-07 RX ADMIN — FENTANYL CITRATE 50 MCG: 50 INJECTION, SOLUTION INTRAMUSCULAR; INTRAVENOUS at 09:08

## 2019-10-07 RX ADMIN — LIDOCAINE HYDROCHLORIDE 0.1 ML: 10 INJECTION, SOLUTION EPIDURAL; INFILTRATION; INTRACAUDAL; PERINEURAL at 08:05

## 2019-10-07 RX ADMIN — ONDANSETRON 4 MG: 2 INJECTION, SOLUTION INTRAMUSCULAR; INTRAVENOUS at 11:14

## 2019-10-07 RX ADMIN — ROCURONIUM BROMIDE 5 MG: 10 INJECTION INTRAVENOUS at 09:08

## 2019-10-07 RX ADMIN — FENTANYL CITRATE 50 MCG: 50 INJECTION, SOLUTION INTRAMUSCULAR; INTRAVENOUS at 09:18

## 2019-10-07 RX ADMIN — ACETAMINOPHEN 1000 MG: 500 TABLET, FILM COATED ORAL at 08:28

## 2019-10-07 RX ADMIN — SCOPALAMINE 1 PATCH: 1 PATCH, EXTENDED RELEASE TRANSDERMAL at 08:29

## 2019-10-07 RX ADMIN — MIDAZOLAM HYDROCHLORIDE 1 MG: 1 INJECTION, SOLUTION INTRAMUSCULAR; INTRAVENOUS at 08:29

## 2019-10-07 RX ADMIN — SUCCINYLCHOLINE CHLORIDE 150 MG: 20 INJECTION, SOLUTION INTRAMUSCULAR; INTRAVENOUS at 09:10

## 2019-10-07 RX ADMIN — KETOROLAC TROMETHAMINE 30 MG: 30 INJECTION INTRAMUSCULAR; INTRAVENOUS at 10:20

## 2019-10-07 RX ADMIN — HYDROMORPHONE HYDROCHLORIDE 0.2 MG: 2 INJECTION, SOLUTION INTRAMUSCULAR; INTRAVENOUS; SUBCUTANEOUS at 10:17

## 2019-10-07 RX ADMIN — CEFAZOLIN SODIUM 2 G: 2 SOLUTION INTRAVENOUS at 09:06

## 2019-10-07 RX ADMIN — PROMETHAZINE HYDROCHLORIDE 12.5 MG: 25 INJECTION INTRAMUSCULAR; INTRAVENOUS at 13:07

## 2019-10-07 RX ADMIN — OXYCODONE HYDROCHLORIDE AND ACETAMINOPHEN 1 TABLET: 5; 325 TABLET ORAL at 14:13

## 2019-10-07 RX ADMIN — DEXAMETHASONE SODIUM PHOSPHATE 8 MG: 4 INJECTION, SOLUTION INTRAMUSCULAR; INTRAVENOUS at 08:29

## 2019-10-07 RX ADMIN — HYDROMORPHONE HYDROCHLORIDE 0.4 MG: 2 INJECTION, SOLUTION INTRAMUSCULAR; INTRAVENOUS; SUBCUTANEOUS at 09:30

## 2019-10-07 RX ADMIN — HYDROMORPHONE HYDROCHLORIDE 0.2 MG: 2 INJECTION, SOLUTION INTRAMUSCULAR; INTRAVENOUS; SUBCUTANEOUS at 10:10

## 2019-10-07 RX ADMIN — LIDOCAINE HYDROCHLORIDE 100 MG: 20 INJECTION, SOLUTION INFILTRATION; PERINEURAL at 09:09

## 2019-10-07 RX ADMIN — ONDANSETRON 4 MG: 2 INJECTION, SOLUTION INTRAMUSCULAR; INTRAVENOUS at 08:29

## 2019-10-07 RX ADMIN — HYDROMORPHONE HYDROCHLORIDE 0.2 MG: 2 INJECTION, SOLUTION INTRAMUSCULAR; INTRAVENOUS; SUBCUTANEOUS at 09:41

## 2019-10-07 NOTE — ANESTHESIA PREPROCEDURE EVALUATION
Anesthesia Evaluation     Patient summary reviewed and Nursing notes reviewed   history of anesthetic complications (motion sick): PONV  NPO Solid Status: > 8 hours  NPO Liquid Status: > 2 hours           Airway   Mallampati: I  TM distance: >3 FB  Neck ROM: full  No difficulty expected  Dental      Comment: 2 crowns on molars    Pulmonary - negative pulmonary ROS and normal exam    breath sounds clear to auscultation  Cardiovascular - negative cardio ROS and normal exam  Exercise tolerance: good (4-7 METS)    Rhythm: regular  Rate: normal        Neuro/Psych  (+) headaches (migraines),     GI/Hepatic/Renal/Endo - negative ROS     Musculoskeletal (-) negative ROS    Abdominal  - normal exam   Substance History - negative use     OB/GYN negative ob/gyn ROS         Other - negative ROS       ROS/Med Hx Other: gatorade 0530                  Anesthesia Plan    ASA 1     general     intravenous induction   Anesthetic plan, all risks, benefits, and alternatives have been provided, discussed and informed consent has been obtained with: patient and mother.  Use of blood products discussed with patient  Consented to blood products.

## 2019-10-07 NOTE — ANESTHESIA PROCEDURE NOTES
Airway  Urgency: elective    Date/Time: 10/7/2019 9:11 AM  Airway not difficult    General Information and Staff    Patient location during procedure: OR  CRNA: Josselyn Umaña CRNA    Indications and Patient Condition  Indications for airway management: airway protection    Preoxygenated: yes  MILS maintained throughout  Mask difficulty assessment: 1 - vent by mask    Final Airway Details  Final airway type: endotracheal airway      Successful airway: ETT  Cuffed: yes   Successful intubation technique: direct laryngoscopy and RSI  Facilitating devices/methods: intubating stylet  Endotracheal tube insertion site: oral  Blade: Bañuelos  Blade size: 2  ETT size (mm): 7.0  Cormack-Lehane Classification: grade I - full view of glottis  Placement verified by: chest auscultation and capnometry   Cuff volume (mL): 6  Measured from: gums  ETT/EBT to gums (cm): 19  Number of attempts at approach: 1  Assessment: lips, teeth, and gum same as pre-op

## 2019-10-07 NOTE — OP NOTE
Preoperative diagnosis cholelithiasis  Postoperative diagnosis the same  Procedure laparoscopic cholecystectomy  Complications none  Drains none  Estimated blood loss 25 cc  Anesthesia General via endotracheal tube  Surgeon Dr. Fournier  Assistant Harris Lyn  Findings gallbladder wall edema  Procedure after satisfactory induction general anesthesia the endotracheal tube the patient's abdomen was prepped and draped in sterile fashion.  Transverse infra umbilical skin incision was made carried down through the skin and subcutaneous tissue.  Fascia was controlled medially and laterally tween 0 Ethibond stay sutures.  Fascia and peritoneum were divided.  Lakshmi trocar was placed within the abdomen.  General survey the abdomen after the abdomen was insufflated to 14 mmHg with use CO2 was essentially normal.  5 mm ports x3 were placed one in the epigastrium 2 in the right upper quadrant under direct vision after each site had been locally infiltrated quarter percent Marcaine with epinephrine.  Gallbladder was grasped next the gallbladder was dissected out at its junction with the cystic duct.  Cystic duct was dissected out cystic artery was dissected out.  Cystic duct was clipped x3 and divided leaving 2 clips on the cystic duct stump.  Cystic artery was clipped x3 and divided leaving 2 clips on the cystic artery stump.  Several lymphatics were hemoclipped as well.  Gallbladder was sequentially taken of the liver bed with use electrocautery.  Hemostasis was assured in the liver bed with electrocautery.  Gallbladder was dropped in Endo Catch bag removed from the abdomen was inspected found to be divided at the neck the gallbladder cystic duct junction.  Right upper quadrant is again visualized all clips were intact site was liberally irrigated hemostasis was assured.  5 mm ports were sequentially pulled back all were hemostatic.  Abdomen was desufflated Lakshmi trocar was removed.  Infra umbilical fascia was closed in  interrupted simple fashion with use of 0 Ethibond placing all sutures completion.  Skin was closed with 4-0 Monocryl running subcuticular stitch burying the knots.  Wounds were clean and dry and sterilely dressed.  The patient tolerated procedure well was transferred to the recovery room in stable condition.  When she is awake alert stable tolerating p.o. and has voided she will be discharged home to follow-up in my office next week call for problems.  Diet as tolerated.  No lifting more than 5 pounds x 6 weeks.  She may shower in the a.m.  She was written a prescription for Percocet 5/325 1-2 p.o. every 4 hours as needed pain 30 these were dispensed without refills.  Phenergan 25 mg p.o. every 4 hours as needed nausea 20 these were dispensed without refills.  She should call for problems and call for an appointment

## 2019-10-07 NOTE — ANESTHESIA POSTPROCEDURE EVALUATION
Patient: Taryn Terry    Procedure Summary     Date:  10/07/19 Room / Location:  [facility] OR 1 /  [facility] OR    Anesthesia Start:  0902 Anesthesia Stop:  1021    Procedure:  CHOLECYSTECTOMY LAPAROSCOPIC (N/A Abdomen) Diagnosis:       Calculus of gallbladder without cholecystitis without obstruction      (Calculus of gallbladder without cholecystitis without obstruction [K80.20])    Surgeon:  Naga Fournier MD Provider:  Josselyn Umaña CRNA    Anesthesia Type:  general ASA Status:  1          Anesthesia Type: general  Last vitals  BP   107/71 (10/07/19 1410)   Temp   97 °F (36.1 °C) (10/07/19 1045)   Pulse   76 (10/07/19 1410)   Resp   16 (10/07/19 1410)     SpO2   97 % (10/07/19 1410)     Post Anesthesia Care and Evaluation    Patient location during evaluation: PHASE II  Patient participation: complete - patient participated  Level of consciousness: awake and alert  Pain score: 3  Pain management: satisfactory to patient  Airway patency: patent  Anesthetic complications: No anesthetic complications  PONV Status: controlled  Cardiovascular status: acceptable  Respiratory status: acceptable  Hydration status: acceptable

## 2019-10-07 NOTE — INTERVAL H&P NOTE
"  H&P updated. The patient was examined and the following changes are noted:  vs  /89 (BP Location: Left arm)   Pulse 69   Temp 98.4 °F (36.9 °C) (Oral)   Resp 12   Ht 162.6 cm (64\")   Wt 62.4 kg (137 lb 9.6 oz)   LMP 07/15/2019 (Approximate)   SpO2 100%   BMI 23.62 kg/m²         "

## 2019-10-08 ENCOUNTER — TELEPHONE (OUTPATIENT)
Dept: SURGERY | Facility: CLINIC | Age: 29
End: 2019-10-08

## 2019-10-08 NOTE — TELEPHONE ENCOUNTER
Patient would like to know when she can return to work and when she will be able to resume light exercise? Such and walking or jogging.

## 2019-10-09 LAB
CYTO UR: NORMAL
LAB AP CASE REPORT: NORMAL
PATH REPORT.FINAL DX SPEC: NORMAL
PATH REPORT.GROSS SPEC: NORMAL

## 2019-10-14 ENCOUNTER — OFFICE VISIT (OUTPATIENT)
Dept: SURGERY | Facility: CLINIC | Age: 29
End: 2019-10-14

## 2019-10-14 DIAGNOSIS — Z09 SURGICAL FOLLOW-UP CARE: Primary | ICD-10-CM

## 2019-10-14 PROCEDURE — 99024 POSTOP FOLLOW-UP VISIT: CPT | Performed by: SURGERY

## 2019-10-14 NOTE — PROGRESS NOTES
1 WK PO lap elicia - states she is still weak  She is without complaints except fine rash over her abdominal wall and left sided rash along her rib cage.  Fine rash is likely secondary to the ChloraPrep her incisions are healing well she does not have any clinical jaundice her pathology was discussed.  There is no impulse I will see her back in 1 month

## 2019-11-11 ENCOUNTER — OFFICE VISIT (OUTPATIENT)
Dept: SURGERY | Facility: CLINIC | Age: 29
End: 2019-11-11

## 2019-11-11 DIAGNOSIS — Z09 SURGICAL FOLLOW-UP CARE: Primary | ICD-10-CM

## 2019-11-11 PROCEDURE — 99024 POSTOP FOLLOW-UP VISIT: CPT | Performed by: SURGERY

## 2019-11-11 RX ORDER — VALACYCLOVIR HYDROCHLORIDE 500 MG/1
500 TABLET, FILM COATED ORAL DAILY
COMMUNITY
End: 2021-04-16

## 2019-11-11 NOTE — PROGRESS NOTES
5 wk PO lap elicia - no problems at this time  She is without complaints.  She does not have any clinical jaundice her incisions are healing well there is no impulse.  In 1 week she can resume normal activities and I will see her back PRN

## 2021-05-03 ENCOUNTER — OFFICE VISIT (OUTPATIENT)
Dept: INTERNAL MEDICINE | Facility: CLINIC | Age: 31
End: 2021-05-03

## 2021-05-03 VITALS
TEMPERATURE: 97.8 F | OXYGEN SATURATION: 98 % | HEART RATE: 102 BPM | SYSTOLIC BLOOD PRESSURE: 120 MMHG | BODY MASS INDEX: 22.66 KG/M2 | HEIGHT: 64 IN | DIASTOLIC BLOOD PRESSURE: 76 MMHG

## 2021-05-03 DIAGNOSIS — G43.009 MIGRAINE WITHOUT AURA AND WITHOUT STATUS MIGRAINOSUS, NOT INTRACTABLE: ICD-10-CM

## 2021-05-03 DIAGNOSIS — A05.9 FOODBORNE GASTROENTERITIS: ICD-10-CM

## 2021-05-03 DIAGNOSIS — F41.1 GENERALIZED ANXIETY DISORDER: ICD-10-CM

## 2021-05-03 DIAGNOSIS — Z00.00 ANNUAL PHYSICAL EXAM: Primary | ICD-10-CM

## 2021-05-03 PROBLEM — K80.20 CALCULUS OF GALLBLADDER WITHOUT CHOLECYSTITIS WITHOUT OBSTRUCTION: Status: RESOLVED | Noted: 2019-09-30 | Resolved: 2021-05-03

## 2021-05-03 PROBLEM — O14.90 PREECLAMPSIA: Status: ACTIVE | Noted: 2021-05-03

## 2021-05-03 PROCEDURE — 99395 PREV VISIT EST AGE 18-39: CPT | Performed by: FAMILY MEDICINE

## 2021-05-03 RX ORDER — SERTRALINE HYDROCHLORIDE 100 MG/1
100 TABLET, FILM COATED ORAL DAILY
Qty: 90 TABLET | Refills: 3 | Status: SHIPPED | OUTPATIENT
Start: 2021-05-03 | End: 2022-05-06 | Stop reason: SDUPTHER

## 2021-05-03 RX ORDER — ACETAMINOPHEN AND CODEINE PHOSPHATE 120; 12 MG/5ML; MG/5ML
0.35 SOLUTION ORAL DAILY
COMMUNITY
Start: 2020-11-13 | End: 2021-05-03

## 2021-05-03 NOTE — PROGRESS NOTES
Date of Encounter: 2021  Patient: Taryn Terry,  1990    Subjective   History of Presenting Illness  Chief complaint: Annual physical    Recent gastroenteritis with nausea, vomiting, diarrhea and both her and her  who ate the same shrimp.  Symptoms lasted approximately 2 days, now have almost completely resolved.  No other symptoms    Generalized anxiety disorder controlled with sertraline.  Recent dose change in context of pregnancy but would like to resume sertraline 100 mg which she has benefited from very well previously.    Migraines without auras, currently managed with Excedrin Migraine.    History of postpartum preeclampsia, normotensive in office today and at urgent care recently.    Lives with , 7-month-old son.  Sexually active, using barrier protection and not interested in contraception at this time.  Breast-feeding and pumping, with no breakthrough bleeding.  Never smoker, does not drink alcohol.  Exercising 3-4 times per week.  Healthy diet.  Cervical cancer screening 2020 with no history of abnormal Pap smears.  Works as a .  Does not have a living will.  Up-to-date on COVID-19, Tdap vaccinations.    Review of Systems:  Negative for fever, congestion, chest pain upon exertion, shortness of breath, vision changes, dysuria, lymphadenopathy, muscle weakness, numbness, mood changes, rashes.    Positive for vomiting, resolving    The following portions of the patient's history were reviewed and updated as appropriate: allergies, current medications, past family history, past medical history, past social history, past surgical history and problem list.    Patient Active Problem List   Diagnosis   • History of preeclampsia   • Migraine without aura and without status migrainosus, not intractable   • Generalized anxiety disorder     Past Medical History:   Diagnosis Date   • Calculus of gallbladder without cholecystitis without obstruction 2019    sched ava rubi  "  • Depression affecting pregnancy    • History of epistaxis     had to have cauterized in college   • Hypotension    • Migraines    • PONV (postoperative nausea and vomiting)      Past Surgical History:   Procedure Laterality Date   • BREAST SURGERY Left 2012    excision left breast mass   • CAUTERIZATION NASAL BLEEDERS      in college d/t frequent nose bleeds, thinks 2012   • CHOLECYSTECTOMY N/A 10/7/2019    Procedure: CHOLECYSTECTOMY LAPAROSCOPIC;  Surgeon: Naga Fournier MD;  Location: [facility];  Service: General     Family History   Problem Relation Age of Onset   • Heart disease Father    • Heart attack Father    • Diabetes Father    • Hypertension Father    • Breast cancer Maternal Grandmother    • Colon cancer Maternal Grandmother    • Endometrial cancer Maternal Grandmother    • Diverticulitis Mother    • Cancer Maternal Grandfather    • Cancer Paternal Grandfather    • Malig Hyperthermia Neg Hx        Current Outpatient Medications:   •  Prenatal Vit-DSS-Fe Cbn-FA (PRENATAL AD PO), Take  by mouth Daily., Disp: , Rfl:   •  sertraline (ZOLOFT) 100 MG tablet, Take 1 tablet by mouth Daily., Disp: 90 tablet, Rfl: 3  No Known Allergies  Social History     Tobacco Use   • Smoking status: Never Smoker   • Smokeless tobacco: Never Used   Substance Use Topics   • Alcohol use: No   • Drug use: No          Objective   Physical Exam  Vitals:    05/03/21 1331   BP: 120/76   Pulse: 102   Temp: 97.8 °F (36.6 °C)   TempSrc: Temporal   SpO2: 98%   Height: 162.6 cm (64\")     Body mass index is 22.66 kg/m².    Constitutional: NAD.  Eyes: EOMI. PERRLA. Normal conjunctiva.  Ear, nose, mouth, throat: No tonsillar exudates or erythema.   Normal nasal mucosa. Normal external ear canals and TMs bilaterally.  Cardiovascular: RRR. No murmurs. No LE edema b/l. Radial pulses 2+ bilaterally.  Pulmonary: CTA b/l. Good effort.  Integumentary: No rashes or wounds on face or upper extremities.  Lymphatic: No anterior cervical " lymphadenopathy.  Endocrine: No thyromegaly or palpable thyroid nodules.  Psychiatric: Normal affect. Normal thought content.  Gastrointestinal: Nondistended. No hepatosplenomegaly. No focal tenderness to palpation. Normal bowel sounds.     Assessment/Plan   Assessment and Plan  Pleasant 31-year-old female with generalized anxiety disorder, migraines without aura, who presents with the following:    Diagnoses and all orders for this visit:    1. Annual physical exam (Primary): We discussed preventative care including age and patient-appropriate immunizations and cancer screening. We also discussed the importance of exercise and a healthy diet. This is their annual preventative exam.    2. Migraine without aura and without status migrainosus, not intractable: Stable    3. Generalized anxiety disorder: Okay to increase sertraline to 100 mg, patient understands risk, although minimal, of breast-feeding on this medication but her infant is 7 months old with no underlying health problems  -     sertraline (ZOLOFT) 100 MG tablet; Take 1 tablet by mouth Daily.  Dispense: 90 tablet; Refill: 3    4. Foodborne gastroenteritis: Resolved    Overall doing very well, I have no concerns. Return to office in 1 year for annual physical or earlier as needed.    Jamil Calderon MD  Family Medicine  O: [phone]  C: [phone]    Disclaimer: Parts of this note were dictated by speech recognition. Minor errors in transcription may be present. Please call if questions.

## 2021-05-13 ENCOUNTER — TELEPHONE (OUTPATIENT)
Dept: INTERNAL MEDICINE | Facility: CLINIC | Age: 31
End: 2021-05-13

## 2021-05-13 NOTE — TELEPHONE ENCOUNTER
Caller: Taryn Terry    Relationship to patient: Self    Best call back number: [phone]     Patient is needing: PATIENT HAS COME DOWN WITH COLDS AND IS BREASTFEEDING AND WANTS TO KNOW THE BEST MEDICATIONS SHE BOTH CAN AND CANT TAKE WHILE BREASTFEEDING . PLEASE CALL PATIENT AND ADVISE .

## 2021-05-14 NOTE — TELEPHONE ENCOUNTER
Tylenol, ibuprofen are okay.  Since her infant is about 7 months old she can also use over-the-counter antihistamines for short courses.  Intranasal steroids like Flonase or Nasacort are safe as well. I do not recommend any medications with ephedrine or pseudoephedrine components, or chlorpheniramine.

## 2021-08-03 ENCOUNTER — OFFICE VISIT (OUTPATIENT)
Dept: INTERNAL MEDICINE | Facility: CLINIC | Age: 31
End: 2021-08-03

## 2021-08-03 VITALS
SYSTOLIC BLOOD PRESSURE: 118 MMHG | DIASTOLIC BLOOD PRESSURE: 62 MMHG | HEART RATE: 94 BPM | OXYGEN SATURATION: 97 % | TEMPERATURE: 98.2 F

## 2021-08-03 DIAGNOSIS — J02.9 PHARYNGITIS, UNSPECIFIED ETIOLOGY: Primary | ICD-10-CM

## 2021-08-03 LAB
EXPIRATION DATE: NORMAL
INTERNAL CONTROL: NORMAL
Lab: NORMAL
S PYO AG THROAT QL: NEGATIVE

## 2021-08-03 PROCEDURE — 99213 OFFICE O/P EST LOW 20 MIN: CPT | Performed by: FAMILY MEDICINE

## 2021-08-03 PROCEDURE — 87880 STREP A ASSAY W/OPTIC: CPT | Performed by: FAMILY MEDICINE

## 2021-08-03 NOTE — PROGRESS NOTES
Subjective   Taryn Terry is a 31 y.o. female.   CC: sore throat  History of Present Illness   Taryn is a 31 year old female who comes in today with the complaint of feeling ill since yesterday.  She spent last week at [facility] and felt fine.  On the drive home yesterday she started feeling bad with sore throat and congestion.  She was very careful while she was gone.  Wore a mask and used hand  and did not go out except to the beach.  She has had her COVID shots.  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  No one else has any symptoms.  She still has sense of taste and smell but it has lessened.  She has nasal drainage which is yellowish.  No cough or fever.  She called her pediatrician's office and was told that she should be checked for COVID.      The following portions of the patient's history were reviewed and updated as appropriate: allergies, current medications, past medical history, past social history and problem list.    Review of Systems   Constitutional: Positive for fatigue. Negative for appetite change, chills, diaphoresis and fever.   HENT: Positive for congestion, ear pain, postnasal drip, rhinorrhea, sinus pain and sore throat.    Respiratory: Negative for cough and shortness of breath.    Cardiovascular: Negative for chest pain and palpitations.   Gastrointestinal: Negative for diarrhea and nausea.   Skin: Negative for rash.       Objective   Physical Exam  Vitals and nursing note reviewed.   Constitutional:       Appearance: Normal appearance.   HENT:      Head: Normocephalic and atraumatic.      Right Ear: Tympanic membrane, ear canal and external ear normal.      Left Ear: Tympanic membrane, ear canal and external ear normal.      Mouth/Throat:      Mouth: Mucous membranes are moist.      Pharynx: Oropharynx is clear. Posterior oropharyngeal erythema present. No oropharyngeal exudate.   Cardiovascular:      Rate and Rhythm: Normal rate and regular rhythm.      Heart sounds: Normal heart sounds.    Pulmonary:      Effort: Pulmonary effort is normal.      Breath sounds: Normal breath sounds.   Lymphadenopathy:      Cervical: Cervical adenopathy present.   Skin:     General: Skin is warm and dry.      Findings: No rash.   Neurological:      General: No focal deficit present.      Mental Status: She is alert and oriented to person, place, and time.   Psychiatric:         Mood and Affect: Mood normal.         Behavior: Behavior normal.       Vitals:    08/03/21 1300   BP: 118/62   Pulse: 94   Temp: 98.2 °F (36.8 °C)   TempSrc: Oral   SpO2: 97%     Assessment/Plan   Diagnoses and all orders for this visit:    1. Pharyngitis, unspecified etiology (Primary)  -     POC Rapid Strep A      COVID test also obtained.

## 2021-08-04 LAB
LABCORP SARS-COV-2, NAA 2 DAY TAT: NORMAL
SARS-COV-2 RNA RESP QL NAA+PROBE: NOT DETECTED

## 2021-08-06 ENCOUNTER — TELEPHONE (OUTPATIENT)
Dept: INTERNAL MEDICINE | Facility: CLINIC | Age: 31
End: 2021-08-06

## 2021-08-06 ENCOUNTER — PATIENT MESSAGE (OUTPATIENT)
Dept: INTERNAL MEDICINE | Facility: CLINIC | Age: 31
End: 2021-08-06

## 2021-08-06 NOTE — TELEPHONE ENCOUNTER
LDVM for pt re: COVID-19 test result. Pt was advised that COVID-19 test was negative, per Dr. Rhodes.

## 2021-08-06 NOTE — TELEPHONE ENCOUNTER
----- Message from Rachna Rhodes MD sent at 8/5/2021  7:27 AM EDT -----  Please let her know that the COVID test is  negative

## 2021-08-09 NOTE — TELEPHONE ENCOUNTER
From: Taryn Terry  To: Jamil Calderon MD  Sent: 8/6/2021 2:29 PM EDT  Subject: Non-Urgent Medical Question    Hi Dr. Calderon,     I know you’re out through August 31st so I’m not expecting a quick response but I wanted to go ahead and message you so I don’t let this slip through the cracks.     My mom was recently diagnosed with coronary artery disease and a mild aortic aneurysm. In light of this, would you recommend that I have my heart checked out?     Hope you are well and your are out of the office for something fun!! Sorry for bothering you just wanted to message you before I forgot and let time fly by!!     Thank you!!

## 2022-05-06 ENCOUNTER — OFFICE VISIT (OUTPATIENT)
Dept: INTERNAL MEDICINE | Facility: CLINIC | Age: 32
End: 2022-05-06

## 2022-05-06 VITALS
DIASTOLIC BLOOD PRESSURE: 74 MMHG | BODY MASS INDEX: 24 KG/M2 | OXYGEN SATURATION: 99 % | TEMPERATURE: 97.3 F | SYSTOLIC BLOOD PRESSURE: 102 MMHG | HEART RATE: 87 BPM | WEIGHT: 139.8 LBS

## 2022-05-06 DIAGNOSIS — Z13.220 SCREENING, LIPID: ICD-10-CM

## 2022-05-06 DIAGNOSIS — F41.1 GENERALIZED ANXIETY DISORDER: ICD-10-CM

## 2022-05-06 DIAGNOSIS — Z13.0 SCREENING, ANEMIA, DEFICIENCY, IRON: ICD-10-CM

## 2022-05-06 DIAGNOSIS — G43.009 MIGRAINE WITHOUT AURA AND WITHOUT STATUS MIGRAINOSUS, NOT INTRACTABLE: ICD-10-CM

## 2022-05-06 DIAGNOSIS — Z00.00 ANNUAL PHYSICAL EXAM: Primary | ICD-10-CM

## 2022-05-06 DIAGNOSIS — Z13.29 SCREENING FOR THYROID DISORDER: ICD-10-CM

## 2022-05-06 DIAGNOSIS — Z13.228 SCREENING FOR METABOLIC DISORDER: ICD-10-CM

## 2022-05-06 PROCEDURE — 99395 PREV VISIT EST AGE 18-39: CPT | Performed by: FAMILY MEDICINE

## 2022-05-06 RX ORDER — LEVONORGESTREL / ETHINYL ESTRADIOL AND ETHINYL ESTRADIOL 150-30(84)
KIT ORAL
COMMUNITY
Start: 2022-02-16

## 2022-05-06 RX ORDER — SERTRALINE HYDROCHLORIDE 100 MG/1
100 TABLET, FILM COATED ORAL DAILY
Qty: 90 TABLET | Refills: 3 | Status: SHIPPED | OUTPATIENT
Start: 2022-05-06

## 2022-05-06 NOTE — PROGRESS NOTES
Date of Encounter: 2022  Patient: Taryn Terry,  1990    Subjective   History of Presenting Illness  Chief complaint: Annual physical    No acute concerns    Interval breast pain, ultrasound was normal, this has resolved in context of combined OCP use.    AL, well controlled on sertraline, would like to continue. No side effects.    Migraines without aura, infrequent, not monthly. Well controlled with ibuprofen.     Lives with , approximately 2-year-old son.  Sexually active, combined OCPs for contraception.  No longer breast-feeding. Never smoker, does not drink alcohol. Not currently exercising.   Diet has been poor recently in context of raising a toddler.  Cervical cancer screening 2021 with no history of abnormal Pap smears. Works as a . Does not have a living will. Up-to-date on COVID-19, Tdap vaccinations.    Review of Systems:  Negative for fever, congestion, chest pain upon exertion, shortness of breath, vision changes, vomiting, dysuria, lymphadenopathy, muscle weakness, numbness, mood changes, rashes.    The following portions of the patient's history were reviewed and updated as appropriate: allergies, current medications, past family history, past medical history, past social history, past surgical history and problem list.    Patient Active Problem List   Diagnosis   • History of preeclampsia   • Migraine without aura and without status migrainosus, not intractable   • Generalized anxiety disorder     Past Medical History:   Diagnosis Date   • Calculus of gallbladder without cholecystitis without obstruction 2019    sched lap elicia   • Depression affecting pregnancy    • History of epistaxis     had to have cauterized in college   • Hypotension    • Migraines    • PONV (postoperative nausea and vomiting)      Past Surgical History:   Procedure Laterality Date   • BREAST SURGERY Left 2012    excision left breast mass   • CAUTERIZATION NASAL BLEEDERS      in college  d/t frequent nose bleeds, thinks 2012   • CHOLECYSTECTOMY N/A 10/7/2019    Procedure: CHOLECYSTECTOMY LAPAROSCOPIC;  Surgeon: Naga Fournier MD;  Location: [facility];  Service: General     Family History   Problem Relation Age of Onset   • Heart disease Father    • Heart attack Father    • Diabetes Father    • Hypertension Father    • Breast cancer Maternal Grandmother    • Colon cancer Maternal Grandmother    • Endometrial cancer Maternal Grandmother    • Diverticulitis Mother    • Cancer Maternal Grandfather    • Cancer Paternal Grandfather    • Malig Hyperthermia Neg Hx        Current Outpatient Medications:   •  Jaimiess 0.15-0.03 &0.01 MG tablet, , Disp: , Rfl:   •  sertraline (ZOLOFT) 100 MG tablet, Take 1 tablet by mouth Daily., Disp: 90 tablet, Rfl: 3  No Known Allergies  Social History     Tobacco Use   • Smoking status: Never Smoker   • Smokeless tobacco: Never Used   Substance Use Topics   • Alcohol use: No   • Drug use: No          Objective   Physical Exam  Vitals:    05/06/22 1113   BP: 102/74   Pulse: 87   Temp: 97.3 °F (36.3 °C)   TempSrc: Temporal   SpO2: 99%   Weight: 63.4 kg (139 lb 12.8 oz)     Body mass index is 24 kg/m².    Constitutional: NAD.  Eyes: EOMI. PERRLA. Normal conjunctiva.  Ear, nose, mouth, throat: No tonsillar exudates or erythema.   Normal nasal mucosa. Normal external ear canals and TMs bilaterally.  Cardiovascular: RRR. No murmurs. No LE edema b/l. Radial pulses 2+ bilaterally.  Pulmonary: CTA b/l. Good effort.  Integumentary: No rashes or wounds on face or upper extremities.  Lymphatic: No anterior cervical lymphadenopathy.  Endocrine: No thyromegaly or palpable thyroid nodules.  Psychiatric: Normal affect. Normal thought content.  Gastrointestinal: Nondistended. No hepatosplenomegaly. No focal tenderness to palpation. Normal bowel sounds.       Assessment/Plan   Assessment and Plan  A pleasant 32-year-old female with generalized anxiety disorder, migraines without aura, who  presents with the following:    Diagnoses and all orders for this visit:    1. Annual physical exam (Primary): We discussed preventative care including age and patient-appropriate immunizations and cancer screening. We also discussed the importance of exercise and a healthy diet. This is their annual preventative exam.    2. Generalized anxiety disorder: Controlled  -     sertraline (ZOLOFT) 100 MG tablet; Take 1 tablet by mouth Daily.  Dispense: 90 tablet; Refill: 3    3. Migraine without aura and without status migrainosus, not intractable: Stable    4. Screening, lipid  -     Lipid Panel    5. Screening for metabolic disorder  -     Comprehensive Metabolic Panel    6. Screening, anemia, deficiency, iron  -     CBC & Differential    7. Screening for thyroid disorder  -     Thyroid Panel With TSH    Return to office in 1 year for annual physical or earlier as needed.    Jamil Calderon MD  Family Medicine  O: [phone]  C: [phone]    Disclaimer: Parts of this note were dictated by speech recognition. Minor errors in transcription may be present. Please call if questions.

## 2022-05-07 LAB
ALBUMIN SERPL-MCNC: 4.4 G/DL (ref 3.9–5)
ALBUMIN/GLOB SERPL: 1.8 {RATIO} (ref 1.2–2.2)
ALP SERPL-CCNC: 48 IU/L (ref 44–121)
ALT SERPL-CCNC: 8 IU/L (ref 0–32)
AST SERPL-CCNC: 18 IU/L (ref 0–40)
BASOPHILS # BLD AUTO: 0 X10E3/UL (ref 0–0.2)
BASOPHILS NFR BLD AUTO: 0 %
BILIRUB SERPL-MCNC: 0.4 MG/DL (ref 0–1.2)
BUN SERPL-MCNC: 12 MG/DL (ref 6–20)
BUN/CREAT SERPL: 14 (ref 9–23)
CALCIUM SERPL-MCNC: 9.4 MG/DL (ref 8.7–10.2)
CHLORIDE SERPL-SCNC: 103 MMOL/L (ref 96–106)
CHOLEST SERPL-MCNC: 180 MG/DL (ref 100–199)
CO2 SERPL-SCNC: 19 MMOL/L (ref 20–29)
CREAT SERPL-MCNC: 0.84 MG/DL (ref 0.57–1)
EGFRCR SERPLBLD CKD-EPI 2021: 96 ML/MIN/1.73
EOSINOPHIL # BLD AUTO: 0.1 X10E3/UL (ref 0–0.4)
EOSINOPHIL NFR BLD AUTO: 1 %
ERYTHROCYTE [DISTWIDTH] IN BLOOD BY AUTOMATED COUNT: 12.5 % (ref 11.7–15.4)
FT4I SERPL CALC-MCNC: 1.7 (ref 1.2–4.9)
GLOBULIN SER CALC-MCNC: 2.4 G/DL (ref 1.5–4.5)
GLUCOSE SERPL-MCNC: 80 MG/DL (ref 65–99)
HCT VFR BLD AUTO: 41.4 % (ref 34–46.6)
HDLC SERPL-MCNC: 63 MG/DL
HGB BLD-MCNC: 13.4 G/DL (ref 11.1–15.9)
IMM GRANULOCYTES # BLD AUTO: 0 X10E3/UL (ref 0–0.1)
IMM GRANULOCYTES NFR BLD AUTO: 1 %
LDLC SERPL CALC-MCNC: 94 MG/DL (ref 0–99)
LYMPHOCYTES # BLD AUTO: 2.2 X10E3/UL (ref 0.7–3.1)
LYMPHOCYTES NFR BLD AUTO: 28 %
MCH RBC QN AUTO: 29.9 PG (ref 26.6–33)
MCHC RBC AUTO-ENTMCNC: 32.4 G/DL (ref 31.5–35.7)
MCV RBC AUTO: 92 FL (ref 79–97)
MONOCYTES # BLD AUTO: 0.4 X10E3/UL (ref 0.1–0.9)
MONOCYTES NFR BLD AUTO: 5 %
NEUTROPHILS # BLD AUTO: 5.1 X10E3/UL (ref 1.4–7)
NEUTROPHILS NFR BLD AUTO: 65 %
PLATELET # BLD AUTO: 327 X10E3/UL (ref 150–450)
POTASSIUM SERPL-SCNC: 5 MMOL/L (ref 3.5–5.2)
PROT SERPL-MCNC: 6.8 G/DL (ref 6–8.5)
RBC # BLD AUTO: 4.48 X10E6/UL (ref 3.77–5.28)
SODIUM SERPL-SCNC: 139 MMOL/L (ref 134–144)
T3RU NFR SERPL: 20 % (ref 24–39)
T4 SERPL-MCNC: 8.6 UG/DL (ref 4.5–12)
TRIGL SERPL-MCNC: 131 MG/DL (ref 0–149)
TSH SERPL DL<=0.005 MIU/L-ACNC: 1.24 UIU/ML (ref 0.45–4.5)
VLDLC SERPL CALC-MCNC: 23 MG/DL (ref 5–40)
WBC # BLD AUTO: 7.8 X10E3/UL (ref 3.4–10.8)

## 2022-05-29 ENCOUNTER — PATIENT MESSAGE (OUTPATIENT)
Dept: INTERNAL MEDICINE | Facility: CLINIC | Age: 32
End: 2022-05-29

## 2022-06-01 RX ORDER — CIPROFLOXACIN HYDROCHLORIDE 3.5 MG/ML
1 SOLUTION/ DROPS TOPICAL 3 TIMES DAILY
Qty: 2.5 ML | Refills: 0 | OUTPATIENT
Start: 2022-06-01 | End: 2022-07-31

## 2022-06-09 ENCOUNTER — PATIENT MESSAGE (OUTPATIENT)
Dept: INTERNAL MEDICINE | Facility: CLINIC | Age: 32
End: 2022-06-09

## 2022-06-09 DIAGNOSIS — G43.009 MIGRAINE WITHOUT AURA AND WITHOUT STATUS MIGRAINOSUS, NOT INTRACTABLE: Primary | ICD-10-CM

## 2022-06-09 RX ORDER — SUMATRIPTAN 50 MG/1
TABLET, FILM COATED ORAL
Qty: 15 TABLET | Refills: 3 | Status: SHIPPED | OUTPATIENT
Start: 2022-06-09

## 2022-06-09 NOTE — TELEPHONE ENCOUNTER
From: Taryn Terry  To: Jamil Calderon MD  Sent: 6/9/2022 3:13 PM EDT  Subject: Migraines     Dr. Calderon you and I had discussed the possibility of a prescription for migraines. I used to be on imatrex that I could take when needed. Could you prescribe something like that again?     The past few days of stormy weather has really messed with my head. I’ve been taking ibuprofen throughout the day for the last three days and I can’t shake the headache.

## 2022-07-30 ENCOUNTER — HOSPITAL ENCOUNTER (EMERGENCY)
Facility: HOSPITAL | Age: 32
Discharge: HOME | End: 2022-07-31
Attending: EMERGENCY MEDICINE | Admitting: EMERGENCY MEDICINE

## 2022-07-30 DIAGNOSIS — R11.2 NAUSEA AND VOMITING, UNSPECIFIED VOMITING TYPE: ICD-10-CM

## 2022-07-30 DIAGNOSIS — U07.1 COVID-19: Primary | ICD-10-CM

## 2022-07-30 LAB
ALBUMIN SERPL-MCNC: 4.6 G/DL (ref 3.5–5.2)
ALBUMIN/GLOB SERPL: 1.5 G/DL
ALP SERPL-CCNC: 51 U/L (ref 39–117)
ALT SERPL W P-5'-P-CCNC: 15 U/L (ref 1–33)
ANION GAP SERPL CALCULATED.3IONS-SCNC: 12.9 MMOL/L (ref 5–15)
AST SERPL-CCNC: 21 U/L (ref 1–32)
BASOPHILS # BLD AUTO: 0 10*3/MM3 (ref 0–0.2)
BASOPHILS NFR BLD AUTO: 0 % (ref 0–1.5)
BILIRUB SERPL-MCNC: 0.5 MG/DL (ref 0–1.2)
BUN SERPL-MCNC: 15 MG/DL (ref 6–20)
BUN/CREAT SERPL: 16.3 (ref 7–25)
CALCIUM SPEC-SCNC: 9.3 MG/DL (ref 8.6–10.5)
CHLORIDE SERPL-SCNC: 103 MMOL/L (ref 98–107)
CO2 SERPL-SCNC: 22.1 MMOL/L (ref 22–29)
CREAT SERPL-MCNC: 0.92 MG/DL (ref 0.57–1)
DEPRECATED RDW RBC AUTO: 42.6 FL (ref 37–54)
EGFRCR SERPLBLD CKD-EPI 2021: 86.1 ML/MIN/1.73
EOSINOPHIL # BLD AUTO: 0.07 10*3/MM3 (ref 0–0.4)
EOSINOPHIL NFR BLD AUTO: 0.5 % (ref 0.3–6.2)
ERYTHROCYTE [DISTWIDTH] IN BLOOD BY AUTOMATED COUNT: 12.3 % (ref 12.3–15.4)
FLUAV RNA RESP QL NAA+PROBE: NOT DETECTED
FLUBV RNA RESP QL NAA+PROBE: NOT DETECTED
GLOBULIN UR ELPH-MCNC: 3.1 GM/DL
GLUCOSE SERPL-MCNC: 105 MG/DL (ref 65–99)
HCT VFR BLD AUTO: 44 % (ref 34–46.6)
HGB BLD-MCNC: 14.8 G/DL (ref 12–15.9)
IMM GRANULOCYTES # BLD AUTO: 0.02 10*3/MM3 (ref 0–0.05)
IMM GRANULOCYTES NFR BLD AUTO: 0.1 % (ref 0–0.5)
LYMPHOCYTES # BLD AUTO: 0.41 10*3/MM3 (ref 0.7–3.1)
LYMPHOCYTES NFR BLD AUTO: 2.7 % (ref 19.6–45.3)
MCH RBC QN AUTO: 30.9 PG (ref 26.6–33)
MCHC RBC AUTO-ENTMCNC: 33.6 G/DL (ref 31.5–35.7)
MCV RBC AUTO: 91.9 FL (ref 79–97)
MONOCYTES # BLD AUTO: 0.46 10*3/MM3 (ref 0.1–0.9)
MONOCYTES NFR BLD AUTO: 3.1 % (ref 5–12)
NEUTROPHILS NFR BLD AUTO: 14.11 10*3/MM3 (ref 1.7–7)
NEUTROPHILS NFR BLD AUTO: 93.6 % (ref 42.7–76)
PLATELET # BLD AUTO: 234 10*3/MM3 (ref 140–450)
PMV BLD AUTO: 9.2 FL (ref 6–12)
POTASSIUM SERPL-SCNC: 4.1 MMOL/L (ref 3.5–5.2)
PROT SERPL-MCNC: 7.7 G/DL (ref 6–8.5)
RBC # BLD AUTO: 4.79 10*6/MM3 (ref 3.77–5.28)
SARS-COV-2 RNA RESP QL NAA+PROBE: DETECTED
SODIUM SERPL-SCNC: 138 MMOL/L (ref 136–145)
WBC NRBC COR # BLD: 15.07 10*3/MM3 (ref 3.4–10.8)

## 2022-07-30 PROCEDURE — 80053 COMPREHEN METABOLIC PANEL: CPT | Performed by: EMERGENCY MEDICINE

## 2022-07-30 PROCEDURE — 99284 EMERGENCY DEPT VISIT MOD MDM: CPT

## 2022-07-30 PROCEDURE — 87636 SARSCOV2 & INF A&B AMP PRB: CPT | Performed by: EMERGENCY MEDICINE

## 2022-07-30 PROCEDURE — 96374 THER/PROPH/DIAG INJ IV PUSH: CPT

## 2022-07-30 PROCEDURE — 25010000002 ONDANSETRON PER 1 MG: Performed by: EMERGENCY MEDICINE

## 2022-07-30 PROCEDURE — 36415 COLL VENOUS BLD VENIPUNCTURE: CPT

## 2022-07-30 PROCEDURE — 85025 COMPLETE CBC W/AUTO DIFF WBC: CPT | Performed by: EMERGENCY MEDICINE

## 2022-07-30 RX ORDER — ONDANSETRON 4 MG/1
4 TABLET, ORALLY DISINTEGRATING ORAL ONCE
Status: DISCONTINUED | OUTPATIENT
Start: 2022-07-31 | End: 2022-07-31

## 2022-07-30 RX ORDER — ONDANSETRON 2 MG/ML
8 INJECTION INTRAMUSCULAR; INTRAVENOUS ONCE
Status: COMPLETED | OUTPATIENT
Start: 2022-07-30 | End: 2022-07-30

## 2022-07-30 RX ORDER — ACETAMINOPHEN 500 MG
1000 TABLET ORAL ONCE
Status: COMPLETED | OUTPATIENT
Start: 2022-07-31 | End: 2022-07-31

## 2022-07-30 RX ADMIN — ONDANSETRON 8 MG: 2 INJECTION INTRAMUSCULAR; INTRAVENOUS at 23:18

## 2022-07-30 RX ADMIN — SODIUM CHLORIDE 1000 ML: 9 INJECTION, SOLUTION INTRAVENOUS at 23:17

## 2022-07-31 VITALS
RESPIRATION RATE: 16 BRPM | WEIGHT: 136 LBS | BODY MASS INDEX: 23.22 KG/M2 | SYSTOLIC BLOOD PRESSURE: 118 MMHG | DIASTOLIC BLOOD PRESSURE: 73 MMHG | OXYGEN SATURATION: 97 % | HEART RATE: 87 BPM | HEIGHT: 64 IN | TEMPERATURE: 98.6 F

## 2022-07-31 PROCEDURE — 25010000002 PROMETHAZINE PER 50 MG: Performed by: EMERGENCY MEDICINE

## 2022-07-31 PROCEDURE — 96375 TX/PRO/DX INJ NEW DRUG ADDON: CPT

## 2022-07-31 RX ORDER — PROMETHAZINE HYDROCHLORIDE 25 MG/ML
INJECTION, SOLUTION INTRAMUSCULAR; INTRAVENOUS
Status: DISCONTINUED
Start: 2022-07-31 | End: 2022-07-31 | Stop reason: HOSPADM

## 2022-07-31 RX ORDER — ONDANSETRON 4 MG/1
4 TABLET, ORALLY DISINTEGRATING ORAL EVERY 8 HOURS PRN
Qty: 21 TABLET | Refills: 0 | Status: SHIPPED | OUTPATIENT
Start: 2022-07-31 | End: 2023-03-09

## 2022-07-31 RX ORDER — SODIUM CHLORIDE 9 MG/ML
INJECTION, SOLUTION INTRAVENOUS
Status: DISCONTINUED
Start: 2022-07-31 | End: 2022-07-31 | Stop reason: HOSPADM

## 2022-07-31 RX ADMIN — PROMETHAZINE HYDROCHLORIDE 12.5 MG: 25 INJECTION INTRAMUSCULAR; INTRAVENOUS at 00:14

## 2022-07-31 RX ADMIN — ACETAMINOPHEN 1000 MG: 500 TABLET ORAL at 00:11

## 2023-03-09 ENCOUNTER — OFFICE VISIT (OUTPATIENT)
Dept: INTERNAL MEDICINE | Facility: CLINIC | Age: 33
End: 2023-03-09
Payer: COMMERCIAL

## 2023-03-09 VITALS
DIASTOLIC BLOOD PRESSURE: 68 MMHG | HEART RATE: 94 BPM | SYSTOLIC BLOOD PRESSURE: 102 MMHG | TEMPERATURE: 96.6 F | BODY MASS INDEX: 24.75 KG/M2 | WEIGHT: 145 LBS | OXYGEN SATURATION: 97 % | HEIGHT: 64 IN

## 2023-03-09 DIAGNOSIS — J30.9 ALLERGIC SINUSITIS: Primary | ICD-10-CM

## 2023-03-09 DIAGNOSIS — H69.82 ACUTE DYSFUNCTION OF LEFT EUSTACHIAN TUBE: ICD-10-CM

## 2023-03-09 PROBLEM — H69.92 ACUTE DYSFUNCTION OF LEFT EUSTACHIAN TUBE: Status: ACTIVE | Noted: 2023-03-09

## 2023-03-09 PROCEDURE — 99213 OFFICE O/P EST LOW 20 MIN: CPT | Performed by: FAMILY MEDICINE

## 2023-03-09 NOTE — PROGRESS NOTES
Date of Encounter: 2023  Patient: Taryn Terry,  1990    Subjective   History of Presenting Illness  Chief complaint: Nasal congestion, left ear fullness    1 week history of intermittent left ear fullness and discomfort, persistent nasal drainage.  Not associate with facial pain, fever, headaches, wheezing.  There is a cough that is mostly upper airway.  She is using saline mist for this which is somewhat helpful.  Not on any other medications for this.     The following portions of the patient's history were reviewed and updated as appropriate: allergies, current medications, past family history, past medical history, past social history, past surgical history and problem list.    Patient Active Problem List   Diagnosis   • History of preeclampsia   • Migraine without aura and without status migrainosus, not intractable   • Generalized anxiety disorder   • Allergic sinusitis   • Acute dysfunction of left eustachian tube     Past Medical History:   Diagnosis Date   • Calculus of gallbladder without cholecystitis without obstruction 2019    sched lap elicia   • Depression affecting pregnancy    • History of epistaxis     had to have cauterized in college   • Hypotension    • Migraines    • PONV (postoperative nausea and vomiting)      Past Surgical History:   Procedure Laterality Date   • BREAST SURGERY Left 2012    excision left breast mass   • CAUTERIZATION NASAL BLEEDERS      in college d/t frequent nose bleeds, thinks    • CHOLECYSTECTOMY N/A 10/7/2019    Procedure: CHOLECYSTECTOMY LAPAROSCOPIC;  Surgeon: Naga Fournier MD;  Location: [facility];  Service: General     Family History   Problem Relation Age of Onset   • Heart disease Father    • Heart attack Father    • Diabetes Father    • Hypertension Father    • Breast cancer Maternal Grandmother    • Colon cancer Maternal Grandmother    • Endometrial cancer Maternal Grandmother    • Cancer Maternal Grandmother    • Diverticulitis Mother  "   • Heart disease Mother    • Hypertension Mother    • Cancer Maternal Grandfather    • Cancer Paternal Grandfather    • Malig Hyperthermia Neg Hx        Current Outpatient Medications:   •  Jaimiess 0.15-0.03 &0.01 MG tablet, , Disp: , Rfl:   •  sertraline (ZOLOFT) 100 MG tablet, Take 1 tablet by mouth Daily., Disp: 90 tablet, Rfl: 3  •  SUMAtriptan (Imitrex) 50 MG tablet, Take one tablet at onset of headache. May repeat dose one time in 2 hours if headache not relieved., Disp: 15 tablet, Rfl: 3  No Known Allergies  Social History     Tobacco Use   • Smoking status: Never   • Smokeless tobacco: Never   Substance Use Topics   • Alcohol use: No   • Drug use: No          Objective   Physical Exam  Vitals:    03/09/23 1021   BP: 102/68   BP Location: Left arm   Patient Position: Sitting   Cuff Size: Adult   Pulse: 94   Temp: 96.6 °F (35.9 °C)   TempSrc: Infrared   SpO2: 97%   Weight: 65.8 kg (145 lb)   Height: 162.6 cm (64\")     Body mass index is 24.89 kg/m².    Constitutional: NAD.  Eyes: EOMI. PERRLA. Normal conjunctiva.  Ear, nose, mouth, throat: Postnasal drip with mild erythema.  No tonsillar exudates or erythema.  boggy nasal mucosa with near obstruction of the nasopharynx by the turbinates.  Serous left ear effusion.  Serous right ear effusion.  No tympanic membrane erythema.  Pulmonary: CTA b/l. Good effort.  Integumentary: No rashes or wounds on face or upper extremities.  Lymphatic: Jugulodigastric node tenderness but no anterior cervical lymphadenopathy.  Endocrine: No thyromegaly or palpable thyroid nodules.  ic: Normal affect. Normal thought content.     Assessment & Plan   Assessment and Plan  A pleasant 33-year-old female with generalized anxiety disorder, migraines without aura, who presents with the following:    Diagnoses and all orders for this visit:    1. Allergic sinusitis (Primary)    2. Acute dysfunction of left eustachian tube    No evidence of bacterial sinus infection or infectious otitis " media.  Recommend trial of nasal corticosteroids, oral antihistamines for allergic sinusitis with eustachian tube dysfunction.  I discussed symptoms of bacterial sinus infection and suppurative otitis media, if she were to have the symptoms she can call and I will send in antibiotics if needed.  Discussed reasons to return for further evaluation.    Jamil Calderon MD  Family Medicine  O: [phone]    Disclaimer: Parts of this note were dictated by speech recognition. Minor errors in transcription may be present. Please call if questions.

## 2023-03-13 ENCOUNTER — PATIENT MESSAGE (OUTPATIENT)
Dept: INTERNAL MEDICINE | Facility: CLINIC | Age: 33
End: 2023-03-13
Payer: COMMERCIAL

## 2023-03-13 DIAGNOSIS — J32.9 BACTERIAL SINUSITIS: Primary | ICD-10-CM

## 2023-03-13 DIAGNOSIS — B96.89 BACTERIAL SINUSITIS: Primary | ICD-10-CM

## 2023-03-14 RX ORDER — AMOXICILLIN AND CLAVULANATE POTASSIUM 875; 125 MG/1; MG/1
1 TABLET, FILM COATED ORAL 2 TIMES DAILY
Qty: 20 TABLET | Refills: 0 | Status: SHIPPED | OUTPATIENT
Start: 2023-03-14 | End: 2023-03-24

## 2023-05-09 ENCOUNTER — PATIENT MESSAGE (OUTPATIENT)
Dept: INTERNAL MEDICINE | Facility: CLINIC | Age: 33
End: 2023-05-09

## 2023-05-09 ENCOUNTER — OFFICE VISIT (OUTPATIENT)
Dept: INTERNAL MEDICINE | Facility: CLINIC | Age: 33
End: 2023-05-09
Payer: COMMERCIAL

## 2023-05-09 VITALS
HEIGHT: 64 IN | BODY MASS INDEX: 27.49 KG/M2 | HEART RATE: 90 BPM | OXYGEN SATURATION: 98 % | WEIGHT: 161 LBS | SYSTOLIC BLOOD PRESSURE: 108 MMHG | TEMPERATURE: 97.3 F | DIASTOLIC BLOOD PRESSURE: 72 MMHG

## 2023-05-09 DIAGNOSIS — R68.82 LOW LIBIDO: ICD-10-CM

## 2023-05-09 DIAGNOSIS — F41.1 GENERALIZED ANXIETY DISORDER: ICD-10-CM

## 2023-05-09 DIAGNOSIS — Z13.220 SCREENING, LIPID: ICD-10-CM

## 2023-05-09 DIAGNOSIS — J30.9 ALLERGIC SINUSITIS: ICD-10-CM

## 2023-05-09 DIAGNOSIS — Z00.00 ANNUAL PHYSICAL EXAM: Primary | ICD-10-CM

## 2023-05-09 DIAGNOSIS — R53.83 FATIGUE, UNSPECIFIED TYPE: Primary | ICD-10-CM

## 2023-05-09 DIAGNOSIS — R53.83 FATIGUE, UNSPECIFIED TYPE: ICD-10-CM

## 2023-05-09 DIAGNOSIS — G43.009 MIGRAINE WITHOUT AURA AND WITHOUT STATUS MIGRAINOSUS, NOT INTRACTABLE: ICD-10-CM

## 2023-05-09 PROBLEM — H69.92 ACUTE DYSFUNCTION OF LEFT EUSTACHIAN TUBE: Status: RESOLVED | Noted: 2023-03-09 | Resolved: 2023-05-09

## 2023-05-09 PROBLEM — H69.82 ACUTE DYSFUNCTION OF LEFT EUSTACHIAN TUBE: Status: RESOLVED | Noted: 2023-03-09 | Resolved: 2023-05-09

## 2023-05-09 PROCEDURE — 99395 PREV VISIT EST AGE 18-39: CPT | Performed by: FAMILY MEDICINE

## 2023-05-09 RX ORDER — SERTRALINE HYDROCHLORIDE 100 MG/1
100 TABLET, FILM COATED ORAL DAILY
Qty: 90 TABLET | Refills: 3 | Status: SHIPPED | OUTPATIENT
Start: 2023-05-09

## 2023-05-09 RX ORDER — CETIRIZINE HYDROCHLORIDE 10 MG/1
10 TABLET ORAL DAILY
COMMUNITY

## 2023-05-09 RX ORDER — FLUTICASONE PROPIONATE 50 MCG
2 SPRAY, SUSPENSION (ML) NASAL DAILY
COMMUNITY

## 2023-05-09 NOTE — PROGRESS NOTES
Date of Encounter: 2023  Patient: Taryn Terry,  1990    Subjective   History of Presenting Illness  Chief complaint: Annual physical     No acute concerns    Nonspecific fatigue that is not severe, patient feels that it may be appropriate in context of her current living situation.  She works and also has to do a lot of the caregiver role at home.  She gets about 8 hours of sleep at night but does not have a lot of time to herself or any time to exercise due to 3-year-old son.  She denies cold intolerance, shortness of breath, chest pain, significant weight changes, skin changes, but does endorse some constipation.  Thyroid testing 1 year ago was normal.  Diet is average to poor mostly due to time limitations with toddler.  She drinks 1 cup of coffee per day and does not drink alcohol.  Skips her blanks on the OCPs and did not have any anemia on last CBC last year.    Generalized anxiety disorder well-controlled on sertraline    Allergic sinusitis taking cetirizine as needed, was using intranasal corticosteroids with recent sinusitis but had nosebleeds and so has backed off of that.  No longer having facial pain since antibiotic treatment.    Migraines without aura, infrequent, not monthly. Well controlled with ibuprofen as needed.     Lives with , 3-year-old son.  Sexually active, combined OCPs for contraception. Never smoker, does not drink alcohol. Not currently exercising.   Diet has been poor.  Cervical cancer screening 2021 with no history of abnormal Pap smears. Works as a . Does not have a living will.  Discussed COVID bivalent.    Review of Systems:  Negative for fever, congestion, chest pain upon exertion, shortness of breath, vision changes, vomiting, dysuria, lymphadenopathy, muscle weakness, numbness, mood changes, rashes.    The following portions of the patient's history were reviewed and updated as appropriate: allergies, current medications, past family history, past  medical history, past social history, past surgical history and problem list.    Patient Active Problem List   Diagnosis   • History of preeclampsia   • Migraine without aura and without status migrainosus, not intractable   • Generalized anxiety disorder   • Allergic sinusitis     Past Medical History:   Diagnosis Date   • Acute dysfunction of left eustachian tube 3/9/2023   • Calculus of gallbladder without cholecystitis without obstruction 09/30/2019    sched lap elicia   • Depression affecting pregnancy    • History of epistaxis     had to have cauterized in college   • Hypotension    • Migraines    • PONV (postoperative nausea and vomiting)      Past Surgical History:   Procedure Laterality Date   • BREAST SURGERY Left 2012    excision left breast mass   • CAUTERIZATION NASAL BLEEDERS      in college d/t frequent nose bleeds, thinks 2012   • CHOLECYSTECTOMY N/A 10/7/2019    Procedure: CHOLECYSTECTOMY LAPAROSCOPIC;  Surgeon: Naga Fournier MD;  Location: [facility];  Service: General     Family History   Problem Relation Age of Onset   • Heart disease Father    • Heart attack Father    • Diabetes Father    • Hypertension Father    • Breast cancer Maternal Grandmother    • Colon cancer Maternal Grandmother    • Endometrial cancer Maternal Grandmother    • Cancer Maternal Grandmother    • Diverticulitis Mother    • Heart disease Mother    • Hypertension Mother    • Cancer Maternal Grandfather    • Cancer Paternal Grandfather    • Malig Hyperthermia Neg Hx        Current Outpatient Medications:   •  cetirizine (zyrTEC) 10 MG tablet, Take 1 tablet by mouth Daily., Disp: , Rfl:   •  fluticasone (FLONASE) 50 MCG/ACT nasal spray, 2 sprays into the nostril(s) as directed by provider Daily., Disp: , Rfl:   •  Jaimiess 0.15-0.03 &0.01 MG tablet, , Disp: , Rfl:   •  sertraline (ZOLOFT) 100 MG tablet, Take 1 tablet by mouth Daily., Disp: 90 tablet, Rfl: 3  •  SUMAtriptan (Imitrex) 50 MG tablet, Take one tablet at onset of  "headache. May repeat dose one time in 2 hours if headache not relieved. (Patient not taking: Reported on 5/9/2023), Disp: 15 tablet, Rfl: 3  No Known Allergies  Social History     Tobacco Use   • Smoking status: Never   • Smokeless tobacco: Never   Substance Use Topics   • Alcohol use: No   • Drug use: No          Objective   Physical Exam  Vitals:    05/09/23 1540   BP: 108/72   BP Location: Left arm   Pulse: 90   Temp: 97.3 °F (36.3 °C)   SpO2: 98%   Weight: 73 kg (161 lb)   Height: 162.6 cm (64.02\")     Body mass index is 27.62 kg/m².    Constitutional: NAD.  Eyes: EOMI. PERRLA. Normal conjunctiva.  Ear, nose, mouth, throat: No tonsillar exudates or erythema.   Normal nasal mucosa. Normal external ear canals and TMs bilaterally.  Cardiovascular: RRR. No murmurs. No LE edema b/l. Radial pulses 2+ bilaterally.  Pulmonary: CTA b/l. Good effort.  Integumentary: No rashes or wounds on face or upper extremities.  Lymphatic: No anterior cervical lymphadenopathy.  Endocrine: No thyromegaly or palpable thyroid nodules.  Psychiatric: Normal affect. Normal thought content.  Gastrointestinal: Nondistended. No hepatosplenomegaly. No focal tenderness to palpation. Normal bowel sounds.     Assessment & Plan   Assessment and Plan  Pleasant 33-year-old female with allergic rhinitis, generalized anxiety disorder, migraines without aura who presents with the following:    Diagnoses and all orders for this visit:    1. Annual physical exam (Primary): We discussed preventative care including age and patient-appropriate immunizations and cancer screening. We also discussed the importance of exercise and a healthy diet. This is their annual preventative exam.    2. Allergic sinusitis: Continue current regimen as tolerated.  Reviewed symptoms of sinus infections.     3. Generalized anxiety disorder: Controlled  -     sertraline (ZOLOFT) 100 MG tablet; Take 1 tablet by mouth Daily.  Dispense: 90 tablet; Refill: 3    4. Migraine without " aura and without status migrainosus, not intractable: Infrequent and managed by ibuprofen    5. Fatigue, unspecified type: Nonspecific, suspect due to lifestyle and responsibilities, if worsening or not improving recommend call or message on MyChart for additional blood work    Return to office in 1 year for annual physical or earlier as needed.    Jamil Calderon MD  Family Medicine  O: [phone]    Disclaimer: Parts of this note were dictated by speech recognition. Minor errors in transcription may be present. Please call if questions.

## 2023-05-11 ENCOUNTER — LAB (OUTPATIENT)
Dept: INTERNAL MEDICINE | Facility: CLINIC | Age: 33
End: 2023-05-11
Payer: COMMERCIAL

## 2023-05-11 DIAGNOSIS — Z13.220 SCREENING, LIPID: ICD-10-CM

## 2023-05-11 DIAGNOSIS — R53.83 FATIGUE, UNSPECIFIED TYPE: ICD-10-CM

## 2023-05-11 DIAGNOSIS — R68.82 LOW LIBIDO: ICD-10-CM

## 2023-05-12 ENCOUNTER — PATIENT MESSAGE (OUTPATIENT)
Dept: INTERNAL MEDICINE | Facility: CLINIC | Age: 33
End: 2023-05-12
Payer: COMMERCIAL

## 2023-05-12 LAB
25(OH)D3+25(OH)D2 SERPL-MCNC: 32 NG/ML (ref 30–100)
ALBUMIN SERPL-MCNC: 3.9 G/DL (ref 3.8–4.8)
ALBUMIN/GLOB SERPL: 1.6 {RATIO} (ref 1.2–2.2)
ALP SERPL-CCNC: 55 IU/L (ref 44–121)
ALT SERPL-CCNC: 7 IU/L (ref 0–32)
AST SERPL-CCNC: 21 IU/L (ref 0–40)
BASOPHILS # BLD AUTO: 0 X10E3/UL (ref 0–0.2)
BASOPHILS NFR BLD AUTO: 1 %
BILIRUB SERPL-MCNC: 0.3 MG/DL (ref 0–1.2)
BUN SERPL-MCNC: 11 MG/DL (ref 6–20)
BUN/CREAT SERPL: 15 (ref 9–23)
CALCIUM SERPL-MCNC: 8.6 MG/DL (ref 8.7–10.2)
CHLORIDE SERPL-SCNC: 102 MMOL/L (ref 96–106)
CHOLEST SERPL-MCNC: 183 MG/DL (ref 100–199)
CO2 SERPL-SCNC: 21 MMOL/L (ref 20–29)
CREAT SERPL-MCNC: 0.74 MG/DL (ref 0.57–1)
EGFRCR SERPLBLD CKD-EPI 2021: 111 ML/MIN/1.73
EOSINOPHIL # BLD AUTO: 0.1 X10E3/UL (ref 0–0.4)
EOSINOPHIL NFR BLD AUTO: 2 %
ERYTHROCYTE [DISTWIDTH] IN BLOOD BY AUTOMATED COUNT: 12.8 % (ref 11.7–15.4)
FSH SERPL-ACNC: 0.4 MIU/ML
FT4I SERPL CALC-MCNC: 1.2 (ref 1.2–4.9)
GLOBULIN SER CALC-MCNC: 2.4 G/DL (ref 1.5–4.5)
GLUCOSE SERPL-MCNC: 76 MG/DL (ref 70–99)
HCT VFR BLD AUTO: 39.7 % (ref 34–46.6)
HDLC SERPL-MCNC: 51 MG/DL
HGB BLD-MCNC: 13.1 G/DL (ref 11.1–15.9)
IMM GRANULOCYTES # BLD AUTO: 0 X10E3/UL (ref 0–0.1)
IMM GRANULOCYTES NFR BLD AUTO: 0 %
LDLC SERPL CALC-MCNC: 100 MG/DL (ref 0–99)
LH SERPL-ACNC: 0.3 MIU/ML
LYMPHOCYTES # BLD AUTO: 1.6 X10E3/UL (ref 0.7–3.1)
LYMPHOCYTES NFR BLD AUTO: 25 %
MCH RBC QN AUTO: 30.3 PG (ref 26.6–33)
MCHC RBC AUTO-ENTMCNC: 33 G/DL (ref 31.5–35.7)
MCV RBC AUTO: 92 FL (ref 79–97)
MONOCYTES # BLD AUTO: 0.4 X10E3/UL (ref 0.1–0.9)
MONOCYTES NFR BLD AUTO: 5 %
NEUTROPHILS # BLD AUTO: 4.4 X10E3/UL (ref 1.4–7)
NEUTROPHILS NFR BLD AUTO: 67 %
PLATELET # BLD AUTO: 284 X10E3/UL (ref 150–450)
POTASSIUM SERPL-SCNC: 4.3 MMOL/L (ref 3.5–5.2)
PROT SERPL-MCNC: 6.3 G/DL (ref 6–8.5)
RBC # BLD AUTO: 4.33 X10E6/UL (ref 3.77–5.28)
SODIUM SERPL-SCNC: 138 MMOL/L (ref 134–144)
T3RU NFR SERPL: 17 % (ref 24–39)
T4 SERPL-MCNC: 6.9 UG/DL (ref 4.5–12)
TRIGL SERPL-MCNC: 186 MG/DL (ref 0–149)
TSH SERPL DL<=0.005 MIU/L-ACNC: 2.1 UIU/ML (ref 0.45–4.5)
VIT B12 SERPL-MCNC: 364 PG/ML (ref 232–1245)
VLDLC SERPL CALC-MCNC: 32 MG/DL (ref 5–40)
WBC # BLD AUTO: 6.6 X10E3/UL (ref 3.4–10.8)

## 2023-05-12 NOTE — PROGRESS NOTES
Your thyroid levels, vitamin D levels, and B12 levels are considered normal. Isolated t3 uptake being low is not clinically significant by itself.    Calcium borderline low, a multivitamin is fine for a bit to help increase this if you cannot do so dietarily    Your FSH and LH are low, and this occurs when on birth control. When these levels are low, testosterone levels are low, and testosterone is one of the main drivers of libido in women. It may be useful to discuss alternative contraception options - either lower dose hormonal contraception (one with slightly less levonorgestrel and estradiol, I can send in if interested), or something non hormonal such as an IUD.

## 2023-09-14 ENCOUNTER — OFFICE VISIT (OUTPATIENT)
Dept: INTERNAL MEDICINE | Facility: CLINIC | Age: 33
End: 2023-09-14
Payer: COMMERCIAL

## 2023-09-14 VITALS
SYSTOLIC BLOOD PRESSURE: 142 MMHG | BODY MASS INDEX: 28.38 KG/M2 | WEIGHT: 166.2 LBS | TEMPERATURE: 98.4 F | OXYGEN SATURATION: 96 % | HEIGHT: 64 IN | DIASTOLIC BLOOD PRESSURE: 84 MMHG | HEART RATE: 77 BPM

## 2023-09-14 DIAGNOSIS — O14.90 PRE-ECLAMPSIA, ANTEPARTUM: ICD-10-CM

## 2023-09-14 DIAGNOSIS — E66.3 OVERWEIGHT (BMI 25.0-29.9): Primary | ICD-10-CM

## 2023-09-14 DIAGNOSIS — R03.0 ELEVATED BLOOD PRESSURE READING: ICD-10-CM

## 2023-09-14 DIAGNOSIS — F41.1 GENERALIZED ANXIETY DISORDER: ICD-10-CM

## 2023-09-14 DIAGNOSIS — Z13.1 SCREENING FOR DIABETES MELLITUS: ICD-10-CM

## 2023-09-14 NOTE — PROGRESS NOTES
"Chief Complaint  Establish Care and Weight Loss (Would like to discuss weight loss prior to trying for second pregnancy.)    Subjective        Taryn Terry presents to [facility]  History of Present Illness    Ms. Terry is a tyler 34 yo F who presents to establish care and discuss weight concerns.     Had a history of post-partum pre-eclampsia.  Works as an  as does her .       Objective   Vital Signs:  /84 (BP Location: Left arm, Patient Position: Sitting, Cuff Size: Adult)   Pulse 77   Temp 98.4 °F (36.9 °C) (Infrared)   Ht 162.6 cm (64.02\")   Wt 75.4 kg (166 lb 3.2 oz)   SpO2 96%   BMI 28.51 kg/m²   Estimated body mass index is 28.51 kg/m² as calculated from the following:    Height as of this encounter: 162.6 cm (64.02\").    Weight as of this encounter: 75.4 kg (166 lb 3.2 oz).       BMI is >= 25 and <30. (Overweight) The following options were offered after discussion;: exercise counseling/recommendations and nutrition counseling/recommendations      Physical Exam  Vitals reviewed.   Constitutional:       General: She is not in acute distress.     Appearance: Normal appearance.   HENT:      Head: Normocephalic and atraumatic.      Nose: Nose normal.      Mouth/Throat:      Mouth: Mucous membranes are moist.   Eyes:      Conjunctiva/sclera: Conjunctivae normal.   Cardiovascular:      Rate and Rhythm: Normal rate and regular rhythm.      Pulses: Normal pulses.      Heart sounds: Normal heart sounds.   Pulmonary:      Effort: Pulmonary effort is normal.      Breath sounds: Normal breath sounds.   Abdominal:      Palpations: Abdomen is soft.      Tenderness: There is no abdominal tenderness.   Musculoskeletal:      Right lower leg: No edema.      Left lower leg: No edema.   Skin:     General: Skin is warm and dry.   Neurological:      General: No focal deficit present.      Mental Status: She is alert.   Psychiatric:         Mood and Affect: Mood normal. "      Result Review :  The following data was reviewed by: Rachna Bolaños MD on 09/14/2023:  Common labs          5/11/2023    08:38   Common Labs   Glucose 76    BUN 11    Creatinine 0.74    Sodium 138    Potassium 4.3    Chloride 102    Calcium 8.6    Total Protein 6.3    Albumin 3.9    Total Bilirubin 0.3    Alkaline Phosphatase 55    AST (SGOT) 21    ALT (SGPT) 7    WBC 6.6    Hemoglobin 13.1    Hematocrit 39.7    Platelets 284    Total Cholesterol 183    Triglycerides 186    HDL Cholesterol 51    LDL Cholesterol  100      Data reviewed : labs from 5/11/23 reviewed             Assessment and Plan   Diagnoses and all orders for this visit:    1. Overweight (BMI 25.0-29.9) (Primary)  -     DHEA-Sulfate; Future  -     Comprehensive Metabolic Panel; Future  -     Hemoglobin A1c; Future  -     Insulin, Free & Total, Serum; Future  -     TSH; Future  -     T4, free; Future    2. Elevated blood pressure reading  -     Comprehensive Metabolic Panel; Future  -     TSH; Future  -     T4, free; Future    3. Generalized anxiety disorder  -     Comprehensive Metabolic Panel; Future  -     TSH; Future  -     T4, free; Future    4. Pre-eclampsia, antepartum  -     Comprehensive Metabolic Panel; Future    5. Screening for diabetes mellitus  -     Comprehensive Metabolic Panel; Future  -     Hemoglobin A1c; Future        Discussed multiple options for lifestyle change and the challenges that surround these changes.  Advised giving herself and her body angelica for all the hard work she is consistently performing.  Discussed possibility of insulin resistance contributing to holding weight.  Will check labs that could fit with that to complete prior work up.  RTC in 1 month for lab review and continued lifestyle counseling.          Follow Up   Return in about 1 month (around 10/14/2023) for f/u labs, weight concerns.  Patient was given instructions and counseling regarding her condition or for health maintenance advice. Please see  specific information pulled into the AVS if appropriate.

## 2023-10-04 DIAGNOSIS — E66.3 OVERWEIGHT (BMI 25.0-29.9): ICD-10-CM

## 2023-10-04 DIAGNOSIS — O14.90 PRE-ECLAMPSIA, ANTEPARTUM: ICD-10-CM

## 2023-10-04 DIAGNOSIS — Z13.1 SCREENING FOR DIABETES MELLITUS: ICD-10-CM

## 2023-10-04 DIAGNOSIS — R03.0 ELEVATED BLOOD PRESSURE READING: ICD-10-CM

## 2023-10-04 DIAGNOSIS — F41.1 GENERALIZED ANXIETY DISORDER: ICD-10-CM

## 2023-10-12 ENCOUNTER — PATIENT MESSAGE (OUTPATIENT)
Dept: INTERNAL MEDICINE | Facility: CLINIC | Age: 33
End: 2023-10-12
Payer: COMMERCIAL

## 2023-10-12 DIAGNOSIS — Z20.818 STREP THROAT EXPOSURE: Primary | ICD-10-CM

## 2023-10-13 RX ORDER — AMOXICILLIN 500 MG/1
500 CAPSULE ORAL 2 TIMES DAILY
Qty: 20 CAPSULE | Refills: 0 | Status: SHIPPED | OUTPATIENT
Start: 2023-10-13 | End: 2023-10-23

## 2023-10-13 NOTE — TELEPHONE ENCOUNTER
From: Taryn Terry  To: Rachna Bolaños  Sent: 10/12/2023 5:18 PM EDT  Subject: [facility]ro[facility],     I'm sorry to bother you but I have a very random question. Our 3 year old just tested positive for strep. Waiting in line at the pharmacy to get his antibiotics now. We are supposed to be leaving for [place] tomorrow. I'm hoping and praying I don't end up with strep. Is there time to get a prescription for an antibiotic as a back up?

## 2023-10-18 LAB
ALBUMIN SERPL-MCNC: 4.5 G/DL (ref 3.9–4.9)
ALBUMIN/GLOB SERPL: 1.9 {RATIO} (ref 1.2–2.2)
ALP SERPL-CCNC: 59 IU/L (ref 44–121)
ALT SERPL-CCNC: 14 IU/L (ref 0–32)
AST SERPL-CCNC: 19 IU/L (ref 0–40)
BILIRUB SERPL-MCNC: <0.2 MG/DL (ref 0–1.2)
BUN SERPL-MCNC: 9 MG/DL (ref 6–20)
BUN/CREAT SERPL: 12 (ref 9–23)
CALCIUM SERPL-MCNC: 9.5 MG/DL (ref 8.7–10.2)
CHLORIDE SERPL-SCNC: 102 MMOL/L (ref 96–106)
CO2 SERPL-SCNC: 22 MMOL/L (ref 20–29)
CREAT SERPL-MCNC: 0.78 MG/DL (ref 0.57–1)
DHEA-S SERPL-MCNC: 22.2 UG/DL (ref 84.8–378)
EGFRCR SERPLBLD CKD-EPI 2021: 104 ML/MIN/1.73
GLOBULIN SER CALC-MCNC: 2.4 G/DL (ref 1.5–4.5)
GLUCOSE SERPL-MCNC: 78 MG/DL (ref 70–99)
HBA1C MFR BLD: 5.3 % (ref 4.8–5.6)
INSULIN FREE SERPL-ACNC: 5.4 UU/ML
INSULIN SERPL-ACNC: 5.4 UU/ML
POTASSIUM SERPL-SCNC: 4.7 MMOL/L (ref 3.5–5.2)
PROT SERPL-MCNC: 6.9 G/DL (ref 6–8.5)
SODIUM SERPL-SCNC: 139 MMOL/L (ref 134–144)
T4 FREE SERPL-MCNC: 1.07 NG/DL (ref 0.82–1.77)
TSH SERPL DL<=0.005 MIU/L-ACNC: 1.14 UIU/ML (ref 0.45–4.5)

## 2023-10-30 ENCOUNTER — OFFICE VISIT (OUTPATIENT)
Dept: INTERNAL MEDICINE | Facility: CLINIC | Age: 33
End: 2023-10-30
Payer: COMMERCIAL

## 2023-10-30 VITALS
HEIGHT: 64 IN | DIASTOLIC BLOOD PRESSURE: 72 MMHG | TEMPERATURE: 97.5 F | HEART RATE: 89 BPM | WEIGHT: 163.8 LBS | SYSTOLIC BLOOD PRESSURE: 108 MMHG | BODY MASS INDEX: 27.96 KG/M2 | OXYGEN SATURATION: 98 %

## 2023-10-30 DIAGNOSIS — E66.3 OVERWEIGHT (BMI 25.0-29.9): Primary | ICD-10-CM

## 2023-10-30 NOTE — PROGRESS NOTES
"      Taryn Terry is a 33 y.o. female who presents with a chief complaint of   Chief Complaint   Patient presents with    Results     F/u labs       HPI     Reviewed labs together and discussed them  Discussed the good days and bad days of eating and how these affect her    Need to research DHEA-S low levels.       The following portions of the patient's history were reviewed and updated as appropriate: allergies, current medications, past family history, past medical history, past social history, past surgical history and problem list.      Current Outpatient Medications:     cetirizine (zyrTEC) 10 MG tablet, Take 1 tablet by mouth Daily., Disp: , Rfl:     Jaimiess 0.15-0.03 &0.01 MG tablet, , Disp: , Rfl:     Prenatal Vit-Fe Fumarate-FA (PRENATAL PO), Take 1 tablet by mouth Daily., Disp: , Rfl:     sertraline (ZOLOFT) 100 MG tablet, Take 1 tablet by mouth Daily., Disp: 90 tablet, Rfl: 3    SUMAtriptan (Imitrex) 50 MG tablet, Take one tablet at onset of headache. May repeat dose one time in 2 hours if headache not relieved., Disp: 15 tablet, Rfl: 3    fluticasone (FLONASE) 50 MCG/ACT nasal spray, 2 sprays into the nostril(s) as directed by provider Daily. (Patient not taking: Reported on 10/30/2023), Disp: , Rfl:             Physical Exam  /72 (BP Location: Left arm, Patient Position: Sitting, Cuff Size: Adult)   Pulse 89   Temp 97.5 °F (36.4 °C) (Infrared)   Ht 162.6 cm (64.02\")   Wt 74.3 kg (163 lb 12.8 oz)   SpO2 98%   BMI 28.10 kg/m²     Physical Exam  Vitals reviewed.   Constitutional:       General: She is not in acute distress.     Appearance: Normal appearance.   HENT:      Head: Normocephalic and atraumatic.      Nose: Nose normal.      Mouth/Throat:      Mouth: Mucous membranes are moist.   Eyes:      Conjunctiva/sclera: Conjunctivae normal.   Pulmonary:      Effort: Pulmonary effort is normal.   Skin:     General: Skin is warm and dry.   Neurological:      General: No focal deficit present. "      Mental Status: She is alert.   Psychiatric:         Mood and Affect: Mood normal.           Results for orders placed or performed in visit on 10/04/23   T4, free    Specimen: Blood   Result Value Ref Range    Free T4 1.07 0.82 - 1.77 ng/dL   TSH    Specimen: Blood   Result Value Ref Range    TSH 1.140 0.450 - 4.500 uIU/mL   Insulin, Free & Total, Serum    Specimen: Blood   Result Value Ref Range    Insulin, Free 5.4 uU/mL    Insulin 5.4 uU/mL   Hemoglobin A1c    Specimen: Blood   Result Value Ref Range    Hemoglobin A1C 5.3 4.8 - 5.6 %   Comprehensive Metabolic Panel    Specimen: Blood   Result Value Ref Range    Glucose 78 70 - 99 mg/dL    BUN 9 6 - 20 mg/dL    Creatinine 0.78 0.57 - 1.00 mg/dL    EGFR Result 104 >59 mL/min/1.73    BUN/Creatinine Ratio 12 9 - 23    Sodium 139 134 - 144 mmol/L    Potassium 4.7 3.5 - 5.2 mmol/L    Chloride 102 96 - 106 mmol/L    Total CO2 22 20 - 29 mmol/L    Calcium 9.5 8.7 - 10.2 mg/dL    Total Protein 6.9 6.0 - 8.5 g/dL    Albumin 4.5 3.9 - 4.9 g/dL    Globulin 2.4 1.5 - 4.5 g/dL    A/G Ratio 1.9 1.2 - 2.2    Total Bilirubin <0.2 0.0 - 1.2 mg/dL    Alkaline Phosphatase 59 44 - 121 IU/L    AST (SGOT) 19 0 - 40 IU/L    ALT (SGPT) 14 0 - 32 IU/L   DHEA-Sulfate    Specimen: Blood   Result Value Ref Range    DHEA-Sulfate 22.2 (L) 84.8 - 378.0 ug/dL           Diagnoses and all orders for this visit:    1. Overweight (BMI 25.0-29.9) (Primary)  -     Ambulatory Referral to Nutrition Services      Good conversation today about balancing life and health and also realizing there are seasons of life.  Refer to nutrition.  F/u in 3 months    I spent 39 minutes on this date of service explaining labs, discussing strategies for dietary/exercise interventions, giving angelica/finding balance.

## 2023-12-11 ENCOUNTER — HOSPITAL ENCOUNTER (EMERGENCY)
Facility: HOSPITAL | Age: 33
Discharge: HOME | End: 2023-12-11
Attending: EMERGENCY MEDICINE | Admitting: EMERGENCY MEDICINE
Payer: COMMERCIAL

## 2023-12-11 ENCOUNTER — APPOINTMENT (OUTPATIENT)
Dept: CT IMAGING | Facility: HOSPITAL | Age: 33
End: 2023-12-11
Payer: COMMERCIAL

## 2023-12-11 VITALS
HEART RATE: 85 BPM | WEIGHT: 150 LBS | DIASTOLIC BLOOD PRESSURE: 80 MMHG | OXYGEN SATURATION: 100 % | BODY MASS INDEX: 25.61 KG/M2 | HEIGHT: 64 IN | TEMPERATURE: 98.2 F | SYSTOLIC BLOOD PRESSURE: 120 MMHG | RESPIRATION RATE: 18 BRPM

## 2023-12-11 DIAGNOSIS — R10.9 ABDOMINAL PAIN, RIGHT LATERAL: Primary | ICD-10-CM

## 2023-12-11 DIAGNOSIS — R74.8 ELEVATED LIVER ENZYMES: ICD-10-CM

## 2023-12-11 LAB
ALBUMIN SERPL-MCNC: 3.9 G/DL (ref 3.5–5.2)
ALBUMIN/GLOB SERPL: 1.6 G/DL
ALP SERPL-CCNC: 88 U/L (ref 39–117)
ALT SERPL W P-5'-P-CCNC: 169 U/L (ref 1–33)
ANION GAP SERPL CALCULATED.3IONS-SCNC: 9.9 MMOL/L (ref 5–15)
AST SERPL-CCNC: 282 U/L (ref 1–32)
B-HCG UR QL: NEGATIVE
BASOPHILS # BLD AUTO: 0.01 10*3/MM3 (ref 0–0.2)
BASOPHILS NFR BLD AUTO: 0.1 % (ref 0–1.5)
BILIRUB SERPL-MCNC: 0.4 MG/DL (ref 0–1.2)
BILIRUB UR QL STRIP: NEGATIVE
BUN SERPL-MCNC: 7 MG/DL (ref 6–20)
BUN/CREAT SERPL: 10.1 (ref 7–25)
CALCIUM SPEC-SCNC: 9 MG/DL (ref 8.6–10.5)
CHLORIDE SERPL-SCNC: 107 MMOL/L (ref 98–107)
CLARITY UR: CLEAR
CO2 SERPL-SCNC: 21.1 MMOL/L (ref 22–29)
COLOR UR: YELLOW
CREAT SERPL-MCNC: 0.69 MG/DL (ref 0.57–1)
DEPRECATED RDW RBC AUTO: 41.9 FL (ref 37–54)
EGFRCR SERPLBLD CKD-EPI 2021: 119.2 ML/MIN/1.73
EOSINOPHIL # BLD AUTO: 0.12 10*3/MM3 (ref 0–0.4)
EOSINOPHIL NFR BLD AUTO: 1.5 % (ref 0.3–6.2)
ERYTHROCYTE [DISTWIDTH] IN BLOOD BY AUTOMATED COUNT: 12.1 % (ref 12.3–15.4)
GLOBULIN UR ELPH-MCNC: 2.5 GM/DL
GLUCOSE SERPL-MCNC: 86 MG/DL (ref 65–99)
GLUCOSE UR STRIP-MCNC: NEGATIVE MG/DL
HAV IGM SERPL QL IA: NORMAL
HBV CORE IGM SERPL QL IA: NORMAL
HBV SURFACE AG SERPL QL IA: NORMAL
HCT VFR BLD AUTO: 38.8 % (ref 34–46.6)
HCV AB SER DONR QL: NORMAL
HGB BLD-MCNC: 13 G/DL (ref 12–15.9)
HGB UR QL STRIP.AUTO: NEGATIVE
HOLD SPECIMEN: NORMAL
HOLD SPECIMEN: NORMAL
IMM GRANULOCYTES # BLD AUTO: 0.01 10*3/MM3 (ref 0–0.05)
IMM GRANULOCYTES NFR BLD AUTO: 0.1 % (ref 0–0.5)
KETONES UR QL STRIP: NEGATIVE
LEUKOCYTE ESTERASE UR QL STRIP.AUTO: NEGATIVE
LIPASE SERPL-CCNC: 34 U/L (ref 13–60)
LYMPHOCYTES # BLD AUTO: 2.08 10*3/MM3 (ref 0.7–3.1)
LYMPHOCYTES NFR BLD AUTO: 26.6 % (ref 19.6–45.3)
MCH RBC QN AUTO: 30.9 PG (ref 26.6–33)
MCHC RBC AUTO-ENTMCNC: 33.5 G/DL (ref 31.5–35.7)
MCV RBC AUTO: 92.2 FL (ref 79–97)
MONOCYTES # BLD AUTO: 0.47 10*3/MM3 (ref 0.1–0.9)
MONOCYTES NFR BLD AUTO: 6 % (ref 5–12)
NEUTROPHILS NFR BLD AUTO: 5.12 10*3/MM3 (ref 1.7–7)
NEUTROPHILS NFR BLD AUTO: 65.7 % (ref 42.7–76)
NITRITE UR QL STRIP: NEGATIVE
PH UR STRIP.AUTO: 6.5 [PH] (ref 4.5–8)
PLATELET # BLD AUTO: 268 10*3/MM3 (ref 140–450)
PMV BLD AUTO: 9.2 FL (ref 6–12)
POTASSIUM SERPL-SCNC: 3.9 MMOL/L (ref 3.5–5.2)
PROT SERPL-MCNC: 6.4 G/DL (ref 6–8.5)
PROT UR QL STRIP: NEGATIVE
RBC # BLD AUTO: 4.21 10*6/MM3 (ref 3.77–5.28)
SODIUM SERPL-SCNC: 138 MMOL/L (ref 136–145)
SP GR UR STRIP: 1.01 (ref 1–1.03)
UROBILINOGEN UR QL STRIP: NORMAL
WBC NRBC COR # BLD AUTO: 7.81 10*3/MM3 (ref 3.4–10.8)
WHOLE BLOOD HOLD COAG: NORMAL
WHOLE BLOOD HOLD SPECIMEN: NORMAL

## 2023-12-11 PROCEDURE — 74177 CT ABD & PELVIS W/CONTRAST: CPT

## 2023-12-11 PROCEDURE — 80053 COMPREHEN METABOLIC PANEL: CPT

## 2023-12-11 PROCEDURE — 99285 EMERGENCY DEPT VISIT HI MDM: CPT

## 2023-12-11 PROCEDURE — 25010000002 ONDANSETRON PER 1 MG

## 2023-12-11 PROCEDURE — 81025 URINE PREGNANCY TEST: CPT

## 2023-12-11 PROCEDURE — 80074 ACUTE HEPATITIS PANEL: CPT

## 2023-12-11 PROCEDURE — 25510000001 IOPAMIDOL PER 1 ML: Performed by: EMERGENCY MEDICINE

## 2023-12-11 PROCEDURE — 81003 URINALYSIS AUTO W/O SCOPE: CPT

## 2023-12-11 PROCEDURE — 25810000003 SODIUM CHLORIDE 0.9 % SOLUTION

## 2023-12-11 PROCEDURE — 96374 THER/PROPH/DIAG INJ IV PUSH: CPT

## 2023-12-11 PROCEDURE — 85025 COMPLETE CBC W/AUTO DIFF WBC: CPT

## 2023-12-11 PROCEDURE — 83690 ASSAY OF LIPASE: CPT

## 2023-12-11 RX ORDER — ONDANSETRON 2 MG/ML
4 INJECTION INTRAMUSCULAR; INTRAVENOUS ONCE
Status: COMPLETED | OUTPATIENT
Start: 2023-12-11 | End: 2023-12-11

## 2023-12-11 RX ORDER — ONDANSETRON 4 MG/1
4 TABLET, ORALLY DISINTEGRATING ORAL EVERY 8 HOURS PRN
Qty: 30 TABLET | Refills: 0 | Status: SHIPPED | OUTPATIENT
Start: 2023-12-11

## 2023-12-11 RX ORDER — SODIUM CHLORIDE 0.9 % (FLUSH) 0.9 %
10 SYRINGE (ML) INJECTION AS NEEDED
Status: DISCONTINUED | OUTPATIENT
Start: 2023-12-11 | End: 2023-12-11 | Stop reason: HOSPADM

## 2023-12-11 RX ORDER — DICYCLOMINE HCL 20 MG
20 TABLET ORAL EVERY 8 HOURS PRN
Qty: 30 TABLET | Refills: 0 | Status: SHIPPED | OUTPATIENT
Start: 2023-12-11

## 2023-12-11 RX ADMIN — IOPAMIDOL 100 ML: 755 INJECTION, SOLUTION INTRAVENOUS at 12:45

## 2023-12-11 RX ADMIN — ONDANSETRON 4 MG: 2 INJECTION INTRAMUSCULAR; INTRAVENOUS at 12:09

## 2023-12-11 RX ADMIN — SODIUM CHLORIDE 1000 ML: 9 INJECTION, SOLUTION INTRAVENOUS at 12:09

## 2023-12-11 NOTE — ED PROVIDER NOTES
EMERGENCY DEPARTMENT ENCOUNTER      Room Number: 12/12    History is provided by the patient, no translation services needed    HPI:    Chief complaint: Abdominal pain    Location: Right-sided    Quality/Severity: The patient admits to a mild aching pain at rest.  She admits to a moderately severe sharp pain with any movement.    Timing/Duration: Since this morning    Modifying Factors: Movement or touching the area makes the pain worse.    Associated Symptoms: Nausea    Narrative: Pt is a 33 y.o. female who presents complaining of right-sided abdominal pain since this morning.  She advises that she has had a mild aching pain in her abdomen for a couple of days.  However, upon awakening this morning she noticed that anytime she would move or touch the right side of her abdomen she would have a moderately severe sharp pain.  She states that the pain is mainly isolated to her right upper abdomen.  She also admits to nausea but denies any vomiting.  She has not had any diarrhea, dysuria, hematuria, blood in her stool, fevers, or chills.  She advises that she previously had her gallbladder removed in 2019.  She denies any additional abdominal surgeries.      PMD: Rachna Bolaños MD    REVIEW OF SYSTEMS  Review of Systems   Constitutional:  Negative for chills and fever.   Eyes:  Negative for photophobia and visual disturbance.   Respiratory:  Negative for cough and shortness of breath.    Cardiovascular:  Negative for chest pain.   Gastrointestinal:  Positive for abdominal pain (Right-sided) and nausea. Negative for blood in stool, constipation, diarrhea and vomiting.   Genitourinary:  Negative for difficulty urinating, dysuria and hematuria.   Musculoskeletal:  Negative for back pain, gait problem and neck pain.   Skin:  Negative for color change, pallor, rash and wound.   Neurological:  Negative for dizziness, syncope, weakness, numbness and headaches.   Psychiatric/Behavioral:  Negative for confusion. The  patient is not nervous/anxious.          PAST MEDICAL HISTORY  Active Ambulatory Problems     Diagnosis Date Noted    History of preeclampsia 05/03/2021    Migraine without aura and without status migrainosus, not intractable 05/03/2021    Generalized anxiety disorder 05/03/2021    Allergic sinusitis 03/09/2023     Resolved Ambulatory Problems     Diagnosis Date Noted    Calculus of gallbladder without cholecystitis without obstruction 09/30/2019    Acute dysfunction of left eustachian tube 03/09/2023     Past Medical History:   Diagnosis Date    Depression affecting pregnancy     History of epistaxis     Hypotension     Migraines     PONV (postoperative nausea and vomiting)        PAST SURGICAL HISTORY  Past Surgical History:   Procedure Laterality Date    BREAST SURGERY Left 2012    excision left breast mass    CAUTERIZATION NASAL BLEEDERS      in college d/t frequent nose bleeds, thinks 2012    CHOLECYSTECTOMY N/A 10/7/2019    Procedure: CHOLECYSTECTOMY LAPAROSCOPIC;  Surgeon: Naga Fournier MD;  Location: [facility];  Service: General       FAMILY HISTORY  Family History   Problem Relation Age of Onset    Heart disease Father     Heart attack Father     Diabetes Father     Hypertension Father     Breast cancer Maternal Grandmother     Colon cancer Maternal Grandmother     Endometrial cancer Maternal Grandmother     Cancer Maternal Grandmother     Diverticulitis Mother     Heart disease Mother     Hypertension Mother     Cancer Maternal Grandfather     Cancer Paternal Grandfather     Malig Hyperthermia Neg Hx        SOCIAL HISTORY  Social History     Socioeconomic History    Marital status:    Tobacco Use    Smoking status: Never    Smokeless tobacco: Never   Substance and Sexual Activity    Alcohol use: No    Drug use: No    Sexual activity: Yes     Partners: Male     Birth control/protection: Pill, Other       ALLERGIES  Patient has no known allergies.      Current Facility-Administered Medications:      sodium chloride 0.9 % flush 10 mL, 10 mL, Intravenous, PRN, Shyam Bunch MD    [COMPLETED] Insert Peripheral IV, , , Once **AND** sodium chloride 0.9 % flush 10 mL, 10 mL, Intravenous, PRN, Summer Abarca PA-C    Current Outpatient Medications:     cetirizine (zyrTEC) 10 MG tablet, Take 1 tablet by mouth Daily., Disp: , Rfl:     dicyclomine (BENTYL) 20 MG tablet, Take 1 tablet by mouth Every 8 (Eight) Hours As Needed for Abdominal Cramping., Disp: 30 tablet, Rfl: 0    fluticasone (FLONASE) 50 MCG/ACT nasal spray, 2 sprays into the nostril(s) as directed by provider Daily. (Patient not taking: Reported on 10/30/2023), Disp: , Rfl:     Jaimiess 0.15-0.03 &0.01 MG tablet, , Disp: , Rfl:     ondansetron ODT (ZOFRAN-ODT) 4 MG disintegrating tablet, Place 1 tablet on the tongue Every 8 (Eight) Hours As Needed for Nausea or Vomiting., Disp: 30 tablet, Rfl: 0    Prenatal Vit-Fe Fumarate-FA (PRENATAL PO), Take 1 tablet by mouth Daily., Disp: , Rfl:     sertraline (ZOLOFT) 100 MG tablet, Take 1 tablet by mouth Daily., Disp: 90 tablet, Rfl: 3    SUMAtriptan (Imitrex) 50 MG tablet, Take one tablet at onset of headache. May repeat dose one time in 2 hours if headache not relieved., Disp: 15 tablet, Rfl: 3    PHYSICAL EXAM  ED Triage Vitals [12/11/23 1113]   Temp Heart Rate Resp BP SpO2   98.2 °F (36.8 °C) 88 16 131/92 98 %      Temp src Heart Rate Source Patient Position BP Location FiO2 (%)   Oral -- -- -- --       Physical Exam  Vitals and nursing note reviewed.   Constitutional:       General: She is not in acute distress.     Appearance: She is well-developed. She is not ill-appearing, toxic-appearing or diaphoretic.   HENT:      Head: Normocephalic and atraumatic.   Eyes:      General: No scleral icterus.     Extraocular Movements: Extraocular movements intact.      Conjunctiva/sclera: Conjunctivae normal.      Pupils: Pupils are equal, round, and reactive to light.   Cardiovascular:      Rate and Rhythm: Normal rate  and regular rhythm.      Heart sounds: Normal heart sounds.      No friction rub.   Pulmonary:      Effort: Pulmonary effort is normal. No respiratory distress.      Breath sounds: Normal breath sounds. No stridor. No wheezing, rhonchi or rales.   Chest:      Chest wall: No tenderness.   Abdominal:      General: Bowel sounds are normal. There is no distension.      Palpations: Abdomen is soft.      Tenderness: There is abdominal tenderness in the right upper quadrant. There is no guarding or rebound. Negative signs include Yanez's sign, Rovsing's sign and McBurney's sign.   Musculoskeletal:         General: Normal range of motion.      Cervical back: Normal range of motion and neck supple.   Skin:     General: Skin is warm and dry.      Coloration: Skin is not cyanotic, jaundiced, mottled or pale.      Findings: No erythema or rash.   Neurological:      Mental Status: She is alert and oriented to person, place, and time.   Psychiatric:         Mood and Affect: Mood and affect normal.           LAB RESULTS  Lab Results (last 24 hours)       Procedure Component Value Units Date/Time    CBC & Differential [[id number]]  (Abnormal) Collected: 12/11/23 1114    Specimen: Blood from Arm, Left Updated: 12/11/23 1122    Narrative:      The following orders were created for panel order CBC & Differential.  Procedure                               Abnormality         Status                     ---------                               -----------         ------                     CBC Auto Differential[[id number]]        Abnormal            Final result                 Please view results for these tests on the individual orders.    Comprehensive Metabolic Panel [[id number]]  (Abnormal) Collected: 12/11/23 1114    Specimen: Blood from Arm, Left Updated: 12/11/23 1141     Glucose 86 mg/dL      BUN 7 mg/dL      Creatinine 0.69 mg/dL      Sodium 138 mmol/L      Potassium 3.9 mmol/L      Chloride 107 mmol/L      CO2 21.1 mmol/L       Calcium 9.0 mg/dL      Total Protein 6.4 g/dL      Albumin 3.9 g/dL      ALT (SGPT) 169 U/L      AST (SGOT) 282 U/L      Alkaline Phosphatase 88 U/L      Total Bilirubin 0.4 mg/dL      Globulin 2.5 gm/dL      A/G Ratio 1.6 g/dL      BUN/Creatinine Ratio 10.1     Anion Gap 9.9 mmol/L      eGFR 119.2 mL/min/1.73     Narrative:      GFR Normal >60  Chronic Kidney Disease <60  Kidney Failure <15      Lipase [[id number]]  (Normal) Collected: 12/11/23 1114    Specimen: Blood from Arm, Left Updated: 12/11/23 1141     Lipase 34 U/L     CBC Auto Differential [[id number]]  (Abnormal) Collected: 12/11/23 1114    Specimen: Blood from Arm, Left Updated: 12/11/23 1122     WBC 7.81 10*3/mm3      RBC 4.21 10*6/mm3      Hemoglobin 13.0 g/dL      Hematocrit 38.8 %      MCV 92.2 fL      MCH 30.9 pg      MCHC 33.5 g/dL      RDW 12.1 %      RDW-SD 41.9 fl      MPV 9.2 fL      Platelets 268 10*3/mm3      Neutrophil % 65.7 %      Lymphocyte % 26.6 %      Monocyte % 6.0 %      Eosinophil % 1.5 %      Basophil % 0.1 %      Immature Grans % 0.1 %      Neutrophils, Absolute 5.12 10*3/mm3      Lymphocytes, Absolute 2.08 10*3/mm3      Monocytes, Absolute 0.47 10*3/mm3      Eosinophils, Absolute 0.12 10*3/mm3      Basophils, Absolute 0.01 10*3/mm3      Immature Grans, Absolute 0.01 10*3/mm3     Hepatitis Panel, Acute [[id number]] Collected: 12/11/23 1114    Specimen: Blood Updated: 12/11/23 1357    Urinalysis With Microscopic If Indicated (No Culture) - Urine, Clean Catch [[id number]]  (Normal) Collected: 12/11/23 1115    Specimen: Urine, Clean Catch Updated: 12/11/23 1126     Color, UA Yellow     Appearance, UA Clear     pH, UA 6.5     Specific Gravity, UA 1.010     Glucose, UA Negative     Ketones, UA Negative     Bilirubin, UA Negative     Blood, UA Negative     Protein, UA Negative     Leuk Esterase, UA Negative     Nitrite, UA Negative     Urobilinogen, UA 0.2 E.U./dL    Narrative:      Urine microscopic not indicated.    Pregnancy, Urine -  Urine, Clean Catch [[id number]]  (Normal) Collected: 12/11/23 1115    Specimen: Urine, Clean Catch Updated: 12/11/23 1126     HCG, Urine QL Negative              I ordered the above labs and reviewed the results    RADIOLOGY  CT Abdomen Pelvis With Contrast    Result Date: 12/11/2023  CT ABDOMEN AND PELVIS WITH CONTRAST, 12/11/2023  HISTORY: 33-year-old female in the ED complaining of right flank pain and nausea.  TECHNIQUE: CT imaging of the abdomen and pelvis ordered with IV contrast. Radiation dose reduction techniques included automated exposure control. High radiation dose exams here in the last 12 months: 0.  ABDOMEN FINDINGS: Lung base images show no active disease.  Cholecystectomy. No bile duct dilatation. Liver, pancreas and spleen are normal in size and appearance.  Both kidneys are negative with no evidence of upper urinary tract obstruction or renal inflammation. Normal adrenal glands.  Small bowel and colon are normal in caliber and appearance. The appendix is normal. No gastric distention or distal esophageal dilatation.  PELVIS FINDINGS: Uterus, ovaries, urinary bladder and rectum are within normal limits. No free pelvic fluid. No inguinal hernia.      1.  Negative CT imaging of the abdomen and pelvis. 2.  Cholecystectomy.   This report was finalized on 12/11/2023 1:40 PM by Dr. Flakito Schroeder MD.       I ordered the above radiologic testing and reviewed the results    PROCEDURES  Procedures      PROGRESS AND CONSULTS  ED Course as of 12/11/23 1400   Mon Dec 11, 2023   1204 The patient appears well.  She is in no acute distress.  Vital signs are stable and within normal limits.  She does have tenderness to palpation of her right upper quadrant on exam.  Labs ordered by the triage RN prior to my arrival.  I have ordered a CT of the abdomen pelvis as well. [AH]   1356 Results discussed in depth with the patient. She expressed understanding. She states that she has no pain at this time and that her  nausea has resolved. She states she feels much better. I expressed concern to her regarding her elevated liver enzymes. Her CT is negative. She denies any use of medications other than nasal spray and occasional OTC cough syrup. She denies any alcohol consumption. I have added a hepatitis panel. She advised that she is comfortable being discharged home at this time and following up with her PCP. I believe this is a good plan. Return to the ED instructions discussed in depth as well. [AH]      ED Course User Index  [AH] Summer Abarca PA-C           MEDICAL DECISION MAKING    MDM     Amount and/or Complexity of Data Reviewed  Decide to obtain previous medical records or to obtain history from someone other than the patient: yes         My differential diagnosis for abdominal pain includes but is not limited to:  Gastritis, gastroenteritis, peptic ulcer disease, GERD, esophageal perforation, acute appendicitis, mesenteric adenitis, Meckel’s diverticulum, epiploic appendagitis, diverticulitis, colon cancer, ulcerative colitis, Crohn’s disease, intussusception, small bowel obstruction, adhesions, ischemic bowel, perforated viscus, ileus, obstipation, biliary colic, cholecystitis, cholelithiasis, Jesus-Severiano Adonay, hepatitis, pancreatitis, common bile duct obstruction, cholangitis, bile leak, splenic trauma, splenic rupture, splenic infarction, splenic abscess, abdominal abscess, ascites, spontaneous bacterial peritonitis, hernia, UTI, cystitis,ureterolithiasis, urinary obstruction, ovarian cyst, torsion, pregnancy, ectopic pregnancy, PID, pelvic abscess, mittelschmerz, endometriosis, AAA, myocardial infarction, pneumonia, cancer, porphyria, DKA, medications, sickle cell, viral syndrome, zoster   DIAGNOSIS  Final diagnoses:   Abdominal pain, right lateral   Elevated liver enzymes       Latest Documented Vital Signs:  As of 14:00 EST  BP- 120/80 HR- 85 Temp- 98.2 °F (36.8 °C) (Oral) O2 sat- 100%    DISPOSITION  Pt  discharged    Discussed pertinent findings with the patient/family.  Patient/Family voiced understanding of need to follow-up for recheck and further testing as needed.  Return to the Emergency Department warnings were given.         Medication List        New Prescriptions      dicyclomine 20 MG tablet  Commonly known as: BENTYL  Take 1 tablet by mouth Every 8 (Eight) Hours As Needed for Abdominal Cramping.     ondansetron ODT 4 MG disintegrating tablet  Commonly known as: ZOFRAN-ODT  Place 1 tablet on the tongue Every 8 (Eight) Hours As Needed for Nausea or Vomiting.               Where to Get Your Medications        These medications were sent to [facility] [id number] - [place], [place] - [address] - [phone]  - [phone]  [address] [address]      Phone: [phone]   dicyclomine 20 MG tablet  ondansetron ODT 4 MG disintegrating tablet              Follow-up Information       Rachna Bolaños MD. Call today.    Specialties: Internal Medicine, Pediatrics  Why: to schedule follow up  Contact information:  [address]  [address] [address] [phone]               Go to  [facility] EMERGENCY DEPARTMENT.    Specialty: Emergency Medicine  Why: If symptoms worsen  Contact information:  [address]  [place] [address] [phone]                             Dictated utilizing Dragon dictation     Summer Abarca PA-C  12/11/23 1400

## 2023-12-13 ENCOUNTER — TELEPHONE (OUTPATIENT)
Dept: GASTROENTEROLOGY | Facility: CLINIC | Age: 33
End: 2023-12-13
Payer: COMMERCIAL

## 2023-12-13 ENCOUNTER — TELEPHONE (OUTPATIENT)
Dept: INTERNAL MEDICINE | Facility: CLINIC | Age: 33
End: 2023-12-13
Payer: COMMERCIAL

## 2023-12-13 ENCOUNTER — HOSPITAL ENCOUNTER (EMERGENCY)
Facility: HOSPITAL | Age: 33
Discharge: HOME | End: 2023-12-13
Attending: EMERGENCY MEDICINE | Admitting: EMERGENCY MEDICINE
Payer: COMMERCIAL

## 2023-12-13 ENCOUNTER — APPOINTMENT (OUTPATIENT)
Dept: ULTRASOUND IMAGING | Facility: HOSPITAL | Age: 33
End: 2023-12-13
Payer: COMMERCIAL

## 2023-12-13 VITALS
TEMPERATURE: 98.4 F | OXYGEN SATURATION: 96 % | HEIGHT: 64 IN | HEART RATE: 78 BPM | WEIGHT: 150 LBS | RESPIRATION RATE: 16 BRPM | DIASTOLIC BLOOD PRESSURE: 74 MMHG | SYSTOLIC BLOOD PRESSURE: 105 MMHG | BODY MASS INDEX: 25.61 KG/M2

## 2023-12-13 DIAGNOSIS — R10.11 RIGHT UPPER QUADRANT ABDOMINAL PAIN: Primary | ICD-10-CM

## 2023-12-13 DIAGNOSIS — R79.89 ELEVATED LIVER FUNCTION TESTS: ICD-10-CM

## 2023-12-13 LAB
ALBUMIN SERPL-MCNC: 4 G/DL (ref 3.5–5.2)
ALBUMIN/GLOB SERPL: 1.7 G/DL
ALP SERPL-CCNC: 80 U/L (ref 39–117)
ALT SERPL W P-5'-P-CCNC: 69 U/L (ref 1–33)
ANION GAP SERPL CALCULATED.3IONS-SCNC: 10.1 MMOL/L (ref 5–15)
AST SERPL-CCNC: 50 U/L (ref 1–32)
BASOPHILS # BLD AUTO: 0.02 10*3/MM3 (ref 0–0.2)
BASOPHILS NFR BLD AUTO: 0.3 % (ref 0–1.5)
BILIRUB SERPL-MCNC: 0.4 MG/DL (ref 0–1.2)
BUN SERPL-MCNC: 8 MG/DL (ref 6–20)
BUN/CREAT SERPL: 10.1 (ref 7–25)
CALCIUM SPEC-SCNC: 9.2 MG/DL (ref 8.6–10.5)
CHLORIDE SERPL-SCNC: 106 MMOL/L (ref 98–107)
CO2 SERPL-SCNC: 20.9 MMOL/L (ref 22–29)
CREAT SERPL-MCNC: 0.79 MG/DL (ref 0.57–1)
DEPRECATED RDW RBC AUTO: 41.2 FL (ref 37–54)
EGFRCR SERPLBLD CKD-EPI 2021: 102.7 ML/MIN/1.73
EOSINOPHIL # BLD AUTO: 0.16 10*3/MM3 (ref 0–0.4)
EOSINOPHIL NFR BLD AUTO: 2.4 % (ref 0.3–6.2)
ERYTHROCYTE [DISTWIDTH] IN BLOOD BY AUTOMATED COUNT: 12.1 % (ref 12.3–15.4)
GLOBULIN UR ELPH-MCNC: 2.4 GM/DL
GLUCOSE SERPL-MCNC: 92 MG/DL (ref 65–99)
HCT VFR BLD AUTO: 39.6 % (ref 34–46.6)
HGB BLD-MCNC: 13.1 G/DL (ref 12–15.9)
IMM GRANULOCYTES # BLD AUTO: 0 10*3/MM3 (ref 0–0.05)
IMM GRANULOCYTES NFR BLD AUTO: 0 % (ref 0–0.5)
LIPASE SERPL-CCNC: 31 U/L (ref 13–60)
LYMPHOCYTES # BLD AUTO: 1.98 10*3/MM3 (ref 0.7–3.1)
LYMPHOCYTES NFR BLD AUTO: 29.6 % (ref 19.6–45.3)
MCH RBC QN AUTO: 30.3 PG (ref 26.6–33)
MCHC RBC AUTO-ENTMCNC: 33.1 G/DL (ref 31.5–35.7)
MCV RBC AUTO: 91.5 FL (ref 79–97)
MONOCYTES # BLD AUTO: 0.38 10*3/MM3 (ref 0.1–0.9)
MONOCYTES NFR BLD AUTO: 5.7 % (ref 5–12)
NEUTROPHILS NFR BLD AUTO: 4.16 10*3/MM3 (ref 1.7–7)
NEUTROPHILS NFR BLD AUTO: 62 % (ref 42.7–76)
PLATELET # BLD AUTO: 265 10*3/MM3 (ref 140–450)
PMV BLD AUTO: 9.1 FL (ref 6–12)
POTASSIUM SERPL-SCNC: 4.9 MMOL/L (ref 3.5–5.2)
PROT SERPL-MCNC: 6.4 G/DL (ref 6–8.5)
RBC # BLD AUTO: 4.33 10*6/MM3 (ref 3.77–5.28)
SODIUM SERPL-SCNC: 137 MMOL/L (ref 136–145)
WBC NRBC COR # BLD AUTO: 6.7 10*3/MM3 (ref 3.4–10.8)

## 2023-12-13 PROCEDURE — 96372 THER/PROPH/DIAG INJ SC/IM: CPT

## 2023-12-13 PROCEDURE — 76705 ECHO EXAM OF ABDOMEN: CPT

## 2023-12-13 PROCEDURE — 83690 ASSAY OF LIPASE: CPT | Performed by: EMERGENCY MEDICINE

## 2023-12-13 PROCEDURE — 85025 COMPLETE CBC W/AUTO DIFF WBC: CPT | Performed by: EMERGENCY MEDICINE

## 2023-12-13 PROCEDURE — 25010000002 DICYCLOMINE PER 20 MG: Performed by: EMERGENCY MEDICINE

## 2023-12-13 PROCEDURE — 80053 COMPREHEN METABOLIC PANEL: CPT | Performed by: EMERGENCY MEDICINE

## 2023-12-13 PROCEDURE — 36415 COLL VENOUS BLD VENIPUNCTURE: CPT

## 2023-12-13 PROCEDURE — 99284 EMERGENCY DEPT VISIT MOD MDM: CPT

## 2023-12-13 RX ORDER — DICYCLOMINE HYDROCHLORIDE 10 MG/ML
20 INJECTION INTRAMUSCULAR ONCE
Status: COMPLETED | OUTPATIENT
Start: 2023-12-13 | End: 2023-12-13

## 2023-12-13 RX ADMIN — DICYCLOMINE HYDROCHLORIDE 20 MG: 20 INJECTION, SOLUTION INTRAMUSCULAR at 10:30

## 2023-12-13 NOTE — DISCHARGE INSTRUCTIONS
Take the Bentyl as needed as prescribed.  Dr. Greeneet today for follow-up appointment in 1 week.  Return to the emergency department if you are worse in any way at all.

## 2023-12-13 NOTE — TELEPHONE ENCOUNTER
Abena toledo Taryn is still in pain and they wanted to come in today we just do not have any openings.  Per her mom she is still in severe pain I got with my manager and I advised her she needs to return to the ED now.  They still want to keep the apt for 12/14.  Mom said she will talk to Taryn and see what she wants to do.  I said she needs to go to ED now.  Per her mom she is going to the ED now.

## 2023-12-13 NOTE — TELEPHONE ENCOUNTER
LM for pt to call me to schedule.      ----- Message from Matthew Stockton MD sent at 12/13/2023 12:27 PM EST -----  Just called from [facility] this is a young pt with abd pain better on Bentyl but LFTs were up and improving- normal CT/US so just needs an APPT with first available provider- thanks

## 2023-12-13 NOTE — TELEPHONE ENCOUNTER
----- Message from Matthew Stockton MD sent at 12/13/2023 12:27 PM EST -----  Just called from [facility] this is a young pt with abd pain better on Bentyl but LFTs were up and improving- normal CT/US so just needs an APPT with first available provider- thanks

## 2023-12-13 NOTE — ED PROVIDER NOTES
Subjective   History of Present Illness    Chief complaint: Abdominal pain    Location: Right upper quadrant    Quality/Severity: Moderate    Timing/Onset/Duration: Monday    Modifying Factors: Nothing seems to make it better, patient did not take her Bentyl.  Food does not seem to make it worse.    Associated Symptoms: No headache.  No fever chills or cough.  No sore throat earache or nasal congestion.  This pain is shortness of breath.  No diarrhea or burning when she urinates.  No black or bloody stools.  Patient did have some nausea.    Narrative: This 33-year-old female presents with right-sided abdominal pain.  Patient was seen here Monday for this.  Patient was prescribed dicylomine on Monday but she has not taken that.  The patient has had cholecystectomy.  She had a cholecystectomy back in 2019.  Patient does not take Tylenol.  She has no history of IV drug abuse or blood transfusion.  Does not drink alcohol.    PCP:Rachna Bolaños MD      Review of Systems   Constitutional:  Negative for chills and fever.   HENT:  Negative for congestion, ear pain and sore throat.    Respiratory:  Negative for cough.    Cardiovascular:  Negative for chest pain.   Gastrointestinal:  Positive for abdominal pain and nausea. Negative for vomiting.   Genitourinary:  Negative for difficulty urinating.   Musculoskeletal:  Negative for back pain.   Skin:  Negative for rash.   Neurological:  Negative for headaches.         Past Medical History:   Diagnosis Date    Acute dysfunction of left eustachian tube 3/9/2023    Calculus of gallbladder without cholecystitis without obstruction 09/30/2019    sched lap elicia    Depression affecting pregnancy     History of epistaxis     had to have cauterized in college    Hypotension     Migraines     PONV (postoperative nausea and vomiting)        No Known Allergies    Past Surgical History:   Procedure Laterality Date    BREAST SURGERY Left 2012    excision left breast mass     CAUTERIZATION NASAL BLEEDERS      in college d/t frequent nose bleeds, thinks 2012    CHOLECYSTECTOMY N/A 10/7/2019    Procedure: CHOLECYSTECTOMY LAPAROSCOPIC;  Surgeon: Naga Fournier MD;  Location: [facility];  Service: General       Family History   Problem Relation Age of Onset    Heart disease Father     Heart attack Father     Diabetes Father     Hypertension Father     Breast cancer Maternal Grandmother     Colon cancer Maternal Grandmother     Endometrial cancer Maternal Grandmother     Cancer Maternal Grandmother     Diverticulitis Mother     Heart disease Mother     Hypertension Mother     Cancer Maternal Grandfather     Cancer Paternal Grandfather     Malig Hyperthermia Neg Hx        Social History     Socioeconomic History    Marital status:    Tobacco Use    Smoking status: Never    Smokeless tobacco: Never   Substance and Sexual Activity    Alcohol use: No    Drug use: No    Sexual activity: Yes     Partners: Male     Birth control/protection: Pill, Other           Objective   Physical Exam  Vitals (The temperature is 98.4 °F, pulse 76, respirations 16, /84, room air pulse ox 95%.) and nursing note reviewed.   Constitutional:       Appearance: Normal appearance.   HENT:      Head: Normocephalic and atraumatic.      Right Ear: Tympanic membrane normal.      Left Ear: Tympanic membrane normal.      Nose: Nose normal.      Mouth/Throat:      Mouth: Mucous membranes are moist.   Cardiovascular:      Rate and Rhythm: Normal rate and regular rhythm.      Pulses: Normal pulses.      Heart sounds: Normal heart sounds. No murmur heard.     No friction rub. No gallop.   Pulmonary:      Effort: Pulmonary effort is normal.      Breath sounds: Normal breath sounds.   Abdominal:      General: Abdomen is flat. Bowel sounds are normal. There is no distension.      Palpations: Abdomen is soft. There is no mass.      Tenderness: There is abdominal tenderness (Moderate right upper quadrant). There is no  right CVA tenderness, left CVA tenderness, guarding or rebound.      Hernia: No hernia is present.   Musculoskeletal:         General: No swelling or tenderness. Normal range of motion.      Cervical back: Normal range of motion and neck supple.   Skin:     General: Skin is warm and dry.      Findings: No rash.   Neurological:      General: No focal deficit present.      Mental Status: She is alert and oriented to person, place, and time.       Procedures           ED Course  ED Course as of 12/13/23 1213   Wed Dec 13, 2023   1040 CBC is unremarkable. [RC]   1107 The ALT is 69, AST is 50, CO2 is 20, CMP is otherwise unremarkable. [RC]   1109 Lipase is 31. [RC]      ED Course User Index  [RC] Toño Go MD      10:00 EST, 12/13/23:  The patient's record shows from the visit dated December 11, 2023 reveals that the patient had negative CT imaging of the abdomen and pelvis.  Patient was status postcholecystectomy.  The ALT is 169 and elevated, AST is 282 and elevated.  The CMP CBC and urinalysis and lipase are otherwise unremarkable.  Urine hCG is negative.  Hepatitis panel is negative.    12:13 EST, 12/13/23:  The patient was reassessed.  The Bentyl relieved her pain.  Her vital signs are stable.    12:13 EST, 12/13/23:  Dr. Stockton, on-call for GI has been paged out.  He returned call.  He will see the patient in follow-up.  The plan will be to have the patient take the Bentyl as she needs for pain.  Her questions were answered she will be discharged in good condition.                                       Medical Decision Making      Final diagnoses:   Right upper quadrant abdominal pain   Elevated liver function tests       ED Disposition  ED Disposition       None            No follow-up provider specified.       Medication List      No changes were made to your prescriptions during this visit.            Toño Go MD  12/13/23 9297

## 2023-12-14 ENCOUNTER — OFFICE VISIT (OUTPATIENT)
Dept: INTERNAL MEDICINE | Facility: CLINIC | Age: 33
End: 2023-12-14
Payer: COMMERCIAL

## 2023-12-14 ENCOUNTER — OFFICE VISIT (OUTPATIENT)
Dept: GASTROENTEROLOGY | Facility: CLINIC | Age: 33
End: 2023-12-14
Payer: COMMERCIAL

## 2023-12-14 ENCOUNTER — LAB (OUTPATIENT)
Dept: LAB | Facility: HOSPITAL | Age: 33
End: 2023-12-14
Payer: COMMERCIAL

## 2023-12-14 VITALS
HEIGHT: 65 IN | WEIGHT: 159 LBS | TEMPERATURE: 98.2 F | BODY MASS INDEX: 26.49 KG/M2 | SYSTOLIC BLOOD PRESSURE: 114 MMHG | HEART RATE: 96 BPM | DIASTOLIC BLOOD PRESSURE: 86 MMHG | OXYGEN SATURATION: 100 %

## 2023-12-14 VITALS
BODY MASS INDEX: 27.39 KG/M2 | WEIGHT: 160.4 LBS | HEIGHT: 64 IN | DIASTOLIC BLOOD PRESSURE: 78 MMHG | SYSTOLIC BLOOD PRESSURE: 120 MMHG

## 2023-12-14 DIAGNOSIS — R11.0 NAUSEA: ICD-10-CM

## 2023-12-14 DIAGNOSIS — K58.1 IRRITABLE BOWEL SYNDROME WITH CONSTIPATION: Primary | ICD-10-CM

## 2023-12-14 DIAGNOSIS — R79.89 ELEVATED LFTS: ICD-10-CM

## 2023-12-14 DIAGNOSIS — R74.01 TRANSAMINITIS: Primary | ICD-10-CM

## 2023-12-14 DIAGNOSIS — R10.11 RIGHT UPPER QUADRANT ABDOMINAL PAIN: ICD-10-CM

## 2023-12-14 DIAGNOSIS — K59.09 OTHER CONSTIPATION: ICD-10-CM

## 2023-12-14 PROCEDURE — 83013 H PYLORI (C-13) BREATH: CPT

## 2023-12-14 RX ORDER — OMEPRAZOLE 40 MG/1
40 CAPSULE, DELAYED RELEASE ORAL DAILY
Qty: 90 CAPSULE | Refills: 3 | Status: SHIPPED | OUTPATIENT
Start: 2023-12-14

## 2023-12-14 NOTE — PROGRESS NOTES
PATIENT INFORMATION  Taryn Terry       - 1990    CHIEF COMPLAINT  Chief Complaint   Patient presents with    Abdominal Pain     RUQ    Elevated Hepatic Enzymes              HISTORY OF PRESENT ILLNESS    Here today for ER follow-up    Monday woke up with RUQ pain and nausea, worse when moving around, went to ER, worsened, went to ER yesterday. Tenderness persistent, most uncomfortable when sitting, coughing, stretching irritates to dull ache. When she tries to eat feels like she is having diarrhea cramps, now she is constipated. BM this morning, small, hard pieces. Nausea improved with zofran in morning. No vomiting, dysphagia, no HB or reflux. Soft BM on Monday. 5 yr old at , so consistent illnesses, diet off track. Dicyclomine does help with cramping.     CBC normal, ALT>AST elevated, improving from 3 days ago. Acute hep panel negative.    Normal bowel pattern, skipping 2-3 days between BM, sometimes causes cramping, stools mix sometimes hard, sometimes not. Diarrhea is more sporadic and lasts for a day, often very triggered by what she is eating. No bleeding. Never has taken anything for bowels. Tried benefiber in past with no relief. Took miralax during entire pregnancy.    Always has had sensitive stomach, frequent hives and diarrhea as child, diagnosed with migraines. GI issues always precede migraines. Notices L side bloats a bit, nausea inconsistent, but enough she wishes she has zofran at home. No NSAIDs.    10/7/2019 cholecystectomy, no stones        REVIEWED PERTINENT RESULTS/ LABS  Lab Results   Component Value Date    CASEREPORT  10/07/2019     Surgical Pathology Report                         Case: [id number]                                  Authorizing Provider:  Naga Fournier MD        Collected:           10/07/2019 09:46 AM          Ordering Location:     [facility]   Received:            10/07/2019 10:29 AM                                 OR                                                                            Pathologist:           Hansel Paz MD                                                         Specimen:    Gallbladder                                                                                FINALDX  10/07/2019     1. Gallbladder, Cholecystectomy:   A. Benign gallbladder with minimal chronic inflammation.    jat/jse        Lab Results   Component Value Date    HGB 13.1 12/13/2023    MCV 91.5 12/13/2023     12/13/2023    ALT 69 (H) 12/13/2023    AST 50 (H) 12/13/2023    HGBA1C 5.3 10/10/2023    TRIG 186 (H) 05/11/2023      US Abdomen Limited    Result Date: 12/13/2023  Narrative: ULTRASOUND ABDOMEN, LIMITED, 12/13/2023     HISTORY: 33-year-old female in the ED complaining of right upper quadrant abdomen pain. Negative CT examination 2 days ago.  TECHNIQUE: Grayscale ultrasound imaging of the right upper abdomen.  COMPARISON: *  CT abdomen/pelvis, 12/11/2023.  FINDINGS: The gallbladder is surgically absent. There is no intrahepatic or extrahepatic bile duct dilatation (CBD 3 mm).  The liver is normal in size and ultrasound appearance. No free fluid in the right upper abdomen.  Visualized portions of the pancreas are negative. Right kidney is negative with no visible nephrolithiasis or hydronephrosis.      Impression: 1. Negative right upper quadrant ultrasound examination. 2. Cholecystectomy. No bile duct dilatation.    This report was finalized on 12/13/2023 11:29 AM by Dr. Flakito Schroeder MD.      CT Abdomen Pelvis With Contrast    Result Date: 12/11/2023  Narrative: CT ABDOMEN AND PELVIS WITH CONTRAST, 12/11/2023  HISTORY: 33-year-old female in the ED complaining of right flank pain and nausea.  TECHNIQUE: CT imaging of the abdomen and pelvis ordered with IV contrast. Radiation dose reduction techniques included automated exposure control. High radiation dose exams here in the last 12 months: 0.  ABDOMEN FINDINGS: Lung base images show no  active disease.  Cholecystectomy. No bile duct dilatation. Liver, pancreas and spleen are normal in size and appearance.  Both kidneys are negative with no evidence of upper urinary tract obstruction or renal inflammation. Normal adrenal glands.  Small bowel and colon are normal in caliber and appearance. The appendix is normal. No gastric distention or distal esophageal dilatation.  PELVIS FINDINGS: Uterus, ovaries, urinary bladder and rectum are within normal limits. No free pelvic fluid. No inguinal hernia.      Impression: 1.  Negative CT imaging of the abdomen and pelvis. 2.  Cholecystectomy.   This report was finalized on 12/11/2023 1:40 PM by Dr. Flakito Schroeder MD.       REVIEW OF SYSTEMS  Review of Systems   Constitutional:  Negative for activity change, chills, fever and unexpected weight change.   HENT:  Negative for congestion.    Eyes:  Negative for visual disturbance.   Respiratory:  Negative for shortness of breath.    Cardiovascular:  Negative for chest pain and palpitations.   Gastrointestinal:  Positive for abdominal distention, abdominal pain (RUQ), constipation and nausea. Negative for blood in stool.   Endocrine: Negative for cold intolerance and heat intolerance.   Genitourinary:  Negative for hematuria.   Musculoskeletal:  Negative for gait problem.   Skin:  Negative for color change.   Allergic/Immunologic: Negative for immunocompromised state.   Neurological:  Negative for weakness and light-headedness.   Hematological:  Negative for adenopathy.   Psychiatric/Behavioral:  Negative for sleep disturbance. The patient is not nervous/anxious.          ACTIVE PROBLEMS  Patient Active Problem List    Diagnosis     Allergic sinusitis [J30.9]     History of preeclampsia [O14.90]     Migraine without aura and without status migrainosus, not intractable [G43.009]     Generalized anxiety disorder [F41.1]          PAST MEDICAL HISTORY  Past Medical History:   Diagnosis Date    Acute dysfunction of  left eustachian tube 3/9/2023    Calculus of gallbladder without cholecystitis without obstruction 09/30/2019    sched lap elicia    Depression affecting pregnancy     History of epistaxis     had to have cauterized in college    Hypotension     Migraines     PONV (postoperative nausea and vomiting)          SURGICAL HISTORY  Past Surgical History:   Procedure Laterality Date    BREAST SURGERY Left 2012    excision left breast mass    CAUTERIZATION NASAL BLEEDERS      in college d/t frequent nose bleeds, thinks 2012    CHOLECYSTECTOMY N/A 10/7/2019    Procedure: CHOLECYSTECTOMY LAPAROSCOPIC;  Surgeon: Naga Fournier MD;  Location: [facility];  Service: General         FAMILY HISTORY  Family History   Problem Relation Age of Onset    Heart disease Father     Heart attack Father     Diabetes Father     Hypertension Father     Breast cancer Maternal Grandmother     Colon cancer Maternal Grandmother     Endometrial cancer Maternal Grandmother     Cancer Maternal Grandmother     Diverticulitis Mother     Heart disease Mother     Hypertension Mother     Cancer Maternal Grandfather     Cancer Paternal Grandfather     Malig Hyperthermia Neg Hx          SOCIAL HISTORY  Social History     Occupational History    Not on file   Tobacco Use    Smoking status: Never    Smokeless tobacco: Never   Substance and Sexual Activity    Alcohol use: No    Drug use: No    Sexual activity: Yes     Partners: Male     Birth control/protection: Pill, Other         CURRENT MEDICATIONS    Current Outpatient Medications:     dicyclomine (BENTYL) 20 MG tablet, Take 1 tablet by mouth Every 8 (Eight) Hours As Needed for Abdominal Cramping., Disp: 30 tablet, Rfl: 0    ondansetron ODT (ZOFRAN-ODT) 4 MG disintegrating tablet, Place 1 tablet on the tongue Every 8 (Eight) Hours As Needed for Nausea or Vomiting., Disp: 30 tablet, Rfl: 0    SUMAtriptan (Imitrex) 50 MG tablet, Take one tablet at onset of headache. May repeat dose one time in 2 hours if  "headache not relieved., Disp: 15 tablet, Rfl: 3    cetirizine (zyrTEC) 10 MG tablet, Take 1 tablet by mouth Daily. (Patient not taking: Reported on 12/14/2023), Disp: , Rfl:     fluticasone (FLONASE) 50 MCG/ACT nasal spray, 2 sprays into the nostril(s) as directed by provider Daily. (Patient not taking: Reported on 10/30/2023), Disp: , Rfl:     Jaimiess 0.15-0.03 &0.01 MG tablet, , Disp: , Rfl:     linaclotide (LINZESS) 290 MCG capsule capsule, Take 1 capsule by mouth Every Morning Before Breakfast., Disp: 30 capsule, Rfl: 11    omeprazole (priLOSEC) 40 MG capsule, Take 1 capsule by mouth Daily., Disp: 90 capsule, Rfl: 3    Prenatal Vit-Fe Fumarate-FA (PRENATAL PO), Take 1 tablet by mouth Daily. (Patient not taking: Reported on 12/14/2023), Disp: , Rfl:     sertraline (ZOLOFT) 100 MG tablet, Take 1 tablet by mouth Daily. (Patient not taking: Reported on 12/14/2023), Disp: 90 tablet, Rfl: 3  No current facility-administered medications for this visit.    ALLERGIES  Patient has no known allergies.    VITALS  Vitals:    12/14/23 0805   BP: 120/78   BP Location: Left arm   Patient Position: Sitting   Cuff Size: Adult   Weight: 72.8 kg (160 lb 6.4 oz)   Height: 162.6 cm (64\")       PHYSICAL EXAM  Debilities/Disabilities Identified: None  Emotional Behavior: Appropriate  Wt Readings from Last 3 Encounters:   12/14/23 72.8 kg (160 lb 6.4 oz)   12/13/23 68 kg (150 lb)   12/11/23 68 kg (150 lb)     Ht Readings from Last 1 Encounters:   12/14/23 162.6 cm (64\")     Body mass index is 27.53 kg/m².  Physical Exam  Constitutional:       General: She is not in acute distress.     Appearance: Normal appearance. She is not ill-appearing.   HENT:      Head: Normocephalic and atraumatic.      Mouth/Throat:      Mouth: Mucous membranes are moist.      Pharynx: No posterior oropharyngeal erythema.   Eyes:      General: No scleral icterus.  Cardiovascular:      Rate and Rhythm: Normal rate and regular rhythm.      Heart sounds: Normal " heart sounds.   Pulmonary:      Effort: Pulmonary effort is normal.      Breath sounds: Normal breath sounds.   Abdominal:      General: Abdomen is flat. Bowel sounds are normal. There is no distension.      Palpations: Abdomen is soft. There is no mass.      Tenderness: There is abdominal tenderness in the epigastric area. There is no guarding or rebound. Negative signs include Yanez's sign.      Hernia: No hernia is present.      Comments: RLQ and LLQ worst   Musculoskeletal:      Cervical back: Neck supple.   Skin:     General: Skin is warm.      Capillary Refill: Capillary refill takes less than 2 seconds.   Neurological:      General: No focal deficit present.      Mental Status: She is alert and oriented to person, place, and time.   Psychiatric:         Mood and Affect: Mood normal.         Behavior: Behavior normal.         Thought Content: Thought content normal.         Judgment: Judgment normal.         CLINICAL DATA REVIEWED   reviewed previous lab results and integrated with today's visit, reviewed notes from other physicians and/or last GI encounter, reviewed previous endoscopy results and available photos, reviewed surgical pathology results from previous biopsies    ASSESSMENT  Diagnoses and all orders for this visit:    Irritable bowel syndrome with constipation  -     linaclotide (LINZESS) 290 MCG capsule capsule; Take 1 capsule by mouth Every Morning Before Breakfast.    Nausea  -     H. Pylori Breath Test - Breath, Lung; Future    Elevated LFTs    Other orders  -     omeprazole (priLOSEC) 40 MG capsule; Take 1 capsule by mouth Daily.          PLAN    LFTs likely due to acute viral illness, will recheck in 1 wk, but generally will normalize within 8. Will give chance to normalize prior to full workup.  HP breath test, start omeprazole  Linzess samples given for a few days to clean out, then will transition to miralax and work to manage with that as patient is family planning  tyler Gilliland  PRN    Return in about 6 weeks (around 1/25/2024).    I have discussed the above plan with the patient.  They verbalize understanding and are in agreement with the plan.  They have been advised to contact the office for any questions, concerns, or changes related to their health.

## 2023-12-15 ENCOUNTER — PATIENT ROUNDING (BHMG ONLY) (OUTPATIENT)
Dept: GASTROENTEROLOGY | Facility: CLINIC | Age: 33
End: 2023-12-15
Payer: COMMERCIAL

## 2023-12-15 LAB — UREA BREATH TEST QL: NEGATIVE

## 2023-12-15 NOTE — PROGRESS NOTES
[facility]  [address]  [facility] [address] [phone].    Before we get started may I verify your date of birth? 1990    I am calling to officially welcome you to our practice and ask about your recent visit. Is this a good time to talk?     Tell me about your visit with us. What things went well?  TC       We're always looking for ways to make our patients' experiences even better. Do you have recommendations on ways we may improve?      Overall were you satisfied with your first visit to our practice?        I appreciate you taking the time to speak with me today. Is there anything else I can do for you?       Thank you, and have a great day.

## 2023-12-18 DIAGNOSIS — R79.89 ELEVATED LFTS: Primary | ICD-10-CM

## 2023-12-22 ENCOUNTER — LAB (OUTPATIENT)
Dept: LAB | Facility: HOSPITAL | Age: 33
End: 2023-12-22
Payer: COMMERCIAL

## 2023-12-22 LAB
ALBUMIN SERPL-MCNC: 4.3 G/DL (ref 3.5–5.2)
ALBUMIN/GLOB SERPL: 1.7 G/DL
ALP SERPL-CCNC: 71 U/L (ref 39–117)
ALT SERPL W P-5'-P-CCNC: 29 U/L (ref 1–33)
ANION GAP SERPL CALCULATED.3IONS-SCNC: 10.9 MMOL/L (ref 5–15)
AST SERPL-CCNC: 25 U/L (ref 1–32)
BILIRUB SERPL-MCNC: 0.4 MG/DL (ref 0–1.2)
BUN SERPL-MCNC: 12 MG/DL (ref 6–20)
BUN/CREAT SERPL: 15.8 (ref 7–25)
CALCIUM SPEC-SCNC: 9.5 MG/DL (ref 8.6–10.5)
CHLORIDE SERPL-SCNC: 107 MMOL/L (ref 98–107)
CO2 SERPL-SCNC: 23.1 MMOL/L (ref 22–29)
CREAT SERPL-MCNC: 0.76 MG/DL (ref 0.57–1)
EGFRCR SERPLBLD CKD-EPI 2021: 107.6 ML/MIN/1.73
GLOBULIN UR ELPH-MCNC: 2.5 GM/DL
GLUCOSE SERPL-MCNC: 85 MG/DL (ref 65–99)
POTASSIUM SERPL-SCNC: 4.5 MMOL/L (ref 3.5–5.2)
PROT SERPL-MCNC: 6.8 G/DL (ref 6–8.5)
SODIUM SERPL-SCNC: 141 MMOL/L (ref 136–145)

## 2023-12-22 PROCEDURE — 36415 COLL VENOUS BLD VENIPUNCTURE: CPT

## 2023-12-22 PROCEDURE — 80053 COMPREHEN METABOLIC PANEL: CPT

## 2023-12-27 ENCOUNTER — HOSPITAL ENCOUNTER (OUTPATIENT)
Dept: DIABETES SERVICES | Facility: HOSPITAL | Age: 33
Discharge: HOME | End: 2023-12-27
Admitting: INTERNAL MEDICINE
Payer: COMMERCIAL

## 2023-12-27 PROCEDURE — 97802 MEDICAL NUTRITION INDIV IN: CPT

## 2023-12-27 NOTE — PROGRESS NOTES
Diabetes Education    Patient Name:  Taryn Terry  YOB: 1990  MRN: [id number]  Admit Date:  12/27/2023    Mrs. Terry met with BELINDA CRISOSTOMO for MNT for weight loss. A comprehensive assessment/training record has been sent to medical records (see media tab) to associate with this encounter.      We discussed the importance of exercising and making dietary changes for weight loss.  We discussed meal planning - she was advised to follow “MyPlate” method to plan meals. We reviewed how to read food labels. We discussed the importance of making small changes for long term success. We reviewed the importance of eating regular meals and not going too long without eating. She was advised to keep food journal to see patterns. Patient verbalized understanding of all information reviewed at today's visit.       Mrs. Terry has been encouraged to call our office with questions or additional education needs. Please place referral for additional services or follow-up should need arise.     Thank you for the referral     Zainab Mao RD, SIMONE, KRANTHI      Electronically signed by:  Zainab Mao RD  12/27/23 12:16 EST

## 2023-12-28 ENCOUNTER — TELEPHONE (OUTPATIENT)
Dept: GASTROENTEROLOGY | Facility: CLINIC | Age: 33
End: 2023-12-28
Payer: COMMERCIAL

## 2023-12-28 DIAGNOSIS — K58.1 IRRITABLE BOWEL SYNDROME WITH CONSTIPATION: Primary | ICD-10-CM

## 2023-12-28 RX ORDER — PRUCALOPRIDE 2 MG/1
1 TABLET, FILM COATED ORAL DAILY
Qty: 30 TABLET | Refills: 11 | Status: SHIPPED | OUTPATIENT
Start: 2023-12-28 | End: 2023-12-29

## 2023-12-29 DIAGNOSIS — K58.1 IRRITABLE BOWEL SYNDROME WITH CONSTIPATION: Primary | ICD-10-CM

## 2023-12-29 RX ORDER — PLECANATIDE 3 MG/1
1 TABLET ORAL DAILY
Qty: 30 TABLET | Refills: 11 | Status: SHIPPED | OUTPATIENT
Start: 2023-12-29

## 2024-01-19 ENCOUNTER — TELEPHONE (OUTPATIENT)
Dept: GASTROENTEROLOGY | Facility: CLINIC | Age: 34
End: 2024-01-19

## 2024-01-19 NOTE — TELEPHONE ENCOUNTER
Called [facility] for copay cost    Trulance: 79.65 with copay card   Omeprazole:6.00    Called pt informed:

## 2024-01-19 NOTE — TELEPHONE ENCOUNTER
Per APRN start 2 week course of Trulance     Denied motegrity  Failed linzess   Contraindicated amitiza     Called [employer] for [place] update/ trulance     Per Kain will need to resubmission  Approval valid for 1 year

## 2024-01-29 ENCOUNTER — OFFICE VISIT (OUTPATIENT)
Dept: INTERNAL MEDICINE | Facility: CLINIC | Age: 34
End: 2024-01-29
Payer: COMMERCIAL

## 2024-01-29 VITALS
TEMPERATURE: 98 F | SYSTOLIC BLOOD PRESSURE: 102 MMHG | DIASTOLIC BLOOD PRESSURE: 68 MMHG | BODY MASS INDEX: 26.02 KG/M2 | HEART RATE: 74 BPM | HEIGHT: 65 IN | OXYGEN SATURATION: 97 % | WEIGHT: 156.2 LBS

## 2024-01-29 DIAGNOSIS — K59.09 OTHER CONSTIPATION: ICD-10-CM

## 2024-01-29 DIAGNOSIS — R63.8 CONCERN ABOUT FOOD OR NUTRITION: ICD-10-CM

## 2024-01-29 DIAGNOSIS — R74.01 TRANSAMINITIS: Primary | ICD-10-CM

## 2024-01-29 DIAGNOSIS — G43.009 MIGRAINE WITHOUT AURA AND WITHOUT STATUS MIGRAINOSUS, NOT INTRACTABLE: ICD-10-CM

## 2024-01-29 PROCEDURE — 99214 OFFICE O/P EST MOD 30 MIN: CPT | Performed by: INTERNAL MEDICINE

## 2024-01-29 RX ORDER — SUMATRIPTAN 100 MG/1
TABLET, FILM COATED ORAL
Qty: 16 TABLET | Refills: 6 | Status: SHIPPED | OUTPATIENT
Start: 2024-01-29

## 2024-01-29 NOTE — PROGRESS NOTES
Taryn Terry is a 33 y.o. female who presents with a chief complaint of   Chief Complaint   Patient presents with    Follow-up       HPI     Now doing Trulance as opposed to Linzess.  Talking with GI about colonoscopy and EGD.       The following portions of the patient's history were reviewed and updated as appropriate: allergies, current medications, past family history, past medical history, past social history, past surgical history and problem list.      Current Outpatient Medications:     omeprazole (priLOSEC) 40 MG capsule, Take 1 capsule by mouth Daily., Disp: 90 capsule, Rfl: 3    ondansetron ODT (ZOFRAN-ODT) 4 MG disintegrating tablet, Place 1 tablet on the tongue Every 8 (Eight) Hours As Needed for Nausea or Vomiting., Disp: 30 tablet, Rfl: 0    Plecanatide (Trulance) 3 MG tablet, Take 1 tablet by mouth Daily., Disp: 30 tablet, Rfl: 11    sertraline (ZOLOFT) 100 MG tablet, Take 1 tablet by mouth Daily., Disp: 90 tablet, Rfl: 3    SUMAtriptan (Imitrex) 100 MG tablet, Take one tablet by mouth at onset of headache. May repeat dose one time in 2 hours if headache not relieved., Disp: 16 tablet, Rfl: 6    cetirizine (zyrTEC) 10 MG tablet, Take 1 tablet by mouth Daily. (Patient not taking: Reported on 1/29/2024), Disp: , Rfl:     dicyclomine (BENTYL) 20 MG tablet, Take 1 tablet by mouth Every 8 (Eight) Hours As Needed for Abdominal Cramping. (Patient not taking: Reported on 1/29/2024), Disp: 30 tablet, Rfl: 0    fluticasone (FLONASE) 50 MCG/ACT nasal spray, 2 sprays into the nostril(s) as directed by provider Daily. (Patient not taking: Reported on 1/29/2024), Disp: , Rfl:     Jaimiess 0.15-0.03 &0.01 MG tablet, , Disp: , Rfl:     Prenatal Vit-Fe Fumarate-FA (PRENATAL PO), Take 1 tablet by mouth Daily. (Patient not taking: Reported on 1/29/2024), Disp: , Rfl:             Physical Exam  /68 (BP Location: Right arm, Patient Position: Sitting, Cuff Size: Adult)   Pulse 74   Temp 98 °F (36.7 °C)  "(Infrared)   Ht 163.8 cm (64.5\")   Wt 70.9 kg (156 lb 3.2 oz)   LMP 01/21/2024 (Exact Date)   SpO2 97%   BMI 26.40 kg/m²     Physical Exam  Vitals reviewed.   Constitutional:       General: She is not in acute distress.     Appearance: Normal appearance.   HENT:      Head: Normocephalic and atraumatic.      Nose: Nose normal.      Mouth/Throat:      Mouth: Mucous membranes are moist.   Eyes:      Conjunctiva/sclera: Conjunctivae normal.   Pulmonary:      Effort: Pulmonary effort is normal.   Skin:     General: Skin is warm and dry.   Neurological:      General: No focal deficit present.      Mental Status: She is alert.   Psychiatric:         Mood and Affect: Mood normal.           Results for orders placed or performed in visit on 12/18/23   Comprehensive Metabolic Panel    Specimen: Blood   Result Value Ref Range    Glucose 85 65 - 99 mg/dL    BUN 12 6 - 20 mg/dL    Creatinine 0.76 0.57 - 1.00 mg/dL    Sodium 141 136 - 145 mmol/L    Potassium 4.5 3.5 - 5.2 mmol/L    Chloride 107 98 - 107 mmol/L    CO2 23.1 22.0 - 29.0 mmol/L    Calcium 9.5 8.6 - 10.5 mg/dL    Total Protein 6.8 6.0 - 8.5 g/dL    Albumin 4.3 3.5 - 5.2 g/dL    ALT (SGPT) 29 1 - 33 U/L    AST (SGOT) 25 1 - 32 U/L    Alkaline Phosphatase 71 39 - 117 U/L    Total Bilirubin 0.4 0.0 - 1.2 mg/dL    Globulin 2.5 gm/dL    A/G Ratio 1.7 g/dL    BUN/Creatinine Ratio 15.8 7.0 - 25.0    Anion Gap 10.9 5.0 - 15.0 mmol/L    eGFR 107.6 >60.0 mL/min/1.73           Diagnoses and all orders for this visit:    1. Transaminitis (Primary)    2. Other constipation    3. Concern about food or nutrition    4. Migraine without aura and without status migrainosus, not intractable  -     SUMAtriptan (Imitrex) 100 MG tablet; Take one tablet by mouth at onset of headache. May repeat dose one time in 2 hours if headache not relieved.  Dispense: 16 tablet; Refill: 6      Has really made some awesome modifications and has also simplified what she is doing nutrition wise after " talking with the nutritionist.      Discussed complexity of working while child deals with illnesses as well.      On Trulance with GI seems to be doing well

## 2024-02-08 DIAGNOSIS — K58.1 IRRITABLE BOWEL SYNDROME WITH CONSTIPATION: ICD-10-CM

## 2024-02-08 RX ORDER — PLECANATIDE 3 MG/1
1 TABLET ORAL DAILY
Qty: 30 TABLET | Refills: 11 | COMMUNITY
Start: 2024-02-08

## 2024-02-12 ENCOUNTER — OFFICE VISIT (OUTPATIENT)
Dept: GASTROENTEROLOGY | Facility: CLINIC | Age: 34
End: 2024-02-12
Payer: COMMERCIAL

## 2024-02-12 VITALS
HEIGHT: 64 IN | BODY MASS INDEX: 27.18 KG/M2 | DIASTOLIC BLOOD PRESSURE: 70 MMHG | SYSTOLIC BLOOD PRESSURE: 126 MMHG | WEIGHT: 159.2 LBS

## 2024-02-12 DIAGNOSIS — K58.1 IRRITABLE BOWEL SYNDROME WITH CONSTIPATION: Primary | ICD-10-CM

## 2024-02-12 DIAGNOSIS — R11.0 NAUSEA: ICD-10-CM

## 2024-02-12 PROCEDURE — 99214 OFFICE O/P EST MOD 30 MIN: CPT

## 2024-02-20 ENCOUNTER — ANESTHESIA EVENT (OUTPATIENT)
Dept: PERIOP | Facility: HOSPITAL | Age: 34
End: 2024-02-20
Payer: COMMERCIAL

## 2024-02-22 ENCOUNTER — ANESTHESIA (OUTPATIENT)
Dept: PERIOP | Facility: HOSPITAL | Age: 34
End: 2024-02-22
Payer: COMMERCIAL

## 2024-02-22 ENCOUNTER — HOSPITAL ENCOUNTER (OUTPATIENT)
Facility: HOSPITAL | Age: 34
Setting detail: HOSPITAL OUTPATIENT SURGERY
Discharge: HOME | End: 2024-02-22
Attending: STUDENT IN AN ORGANIZED HEALTH CARE EDUCATION/TRAINING PROGRAM | Admitting: STUDENT IN AN ORGANIZED HEALTH CARE EDUCATION/TRAINING PROGRAM
Payer: COMMERCIAL

## 2024-02-22 VITALS
DIASTOLIC BLOOD PRESSURE: 101 MMHG | TEMPERATURE: 98.1 F | WEIGHT: 151.4 LBS | SYSTOLIC BLOOD PRESSURE: 117 MMHG | OXYGEN SATURATION: 99 % | RESPIRATION RATE: 16 BRPM | HEART RATE: 60 BPM | BODY MASS INDEX: 25.6 KG/M2

## 2024-02-22 DIAGNOSIS — R11.0 NAUSEA: ICD-10-CM

## 2024-02-22 DIAGNOSIS — K58.1 IRRITABLE BOWEL SYNDROME WITH CONSTIPATION: ICD-10-CM

## 2024-02-22 PROCEDURE — 43251 EGD REMOVE LESION SNARE: CPT | Performed by: STUDENT IN AN ORGANIZED HEALTH CARE EDUCATION/TRAINING PROGRAM

## 2024-02-22 PROCEDURE — 88305 TISSUE EXAM BY PATHOLOGIST: CPT | Performed by: STUDENT IN AN ORGANIZED HEALTH CARE EDUCATION/TRAINING PROGRAM

## 2024-02-22 PROCEDURE — 43239 EGD BIOPSY SINGLE/MULTIPLE: CPT | Performed by: STUDENT IN AN ORGANIZED HEALTH CARE EDUCATION/TRAINING PROGRAM

## 2024-02-22 PROCEDURE — 45378 DIAGNOSTIC COLONOSCOPY: CPT | Performed by: STUDENT IN AN ORGANIZED HEALTH CARE EDUCATION/TRAINING PROGRAM

## 2024-02-22 PROCEDURE — 25010000002 PROPOFOL 10 MG/ML EMULSION: Performed by: NURSE ANESTHETIST, CERTIFIED REGISTERED

## 2024-02-22 PROCEDURE — 88342 IMHCHEM/IMCYTCHM 1ST ANTB: CPT | Performed by: STUDENT IN AN ORGANIZED HEALTH CARE EDUCATION/TRAINING PROGRAM

## 2024-02-22 PROCEDURE — 25810000003 LACTATED RINGERS PER 1000 ML: Performed by: NURSE ANESTHETIST, CERTIFIED REGISTERED

## 2024-02-22 RX ORDER — ONDANSETRON 2 MG/ML
4 INJECTION INTRAMUSCULAR; INTRAVENOUS ONCE AS NEEDED
Status: DISCONTINUED | OUTPATIENT
Start: 2024-02-22 | End: 2024-02-22 | Stop reason: HOSPADM

## 2024-02-22 RX ORDER — LIDOCAINE HYDROCHLORIDE 20 MG/ML
INJECTION, SOLUTION INFILTRATION; PERINEURAL AS NEEDED
Status: DISCONTINUED | OUTPATIENT
Start: 2024-02-22 | End: 2024-02-22 | Stop reason: SURG

## 2024-02-22 RX ORDER — SODIUM CHLORIDE 0.9 % (FLUSH) 0.9 %
10 SYRINGE (ML) INJECTION EVERY 12 HOURS SCHEDULED
Status: DISCONTINUED | OUTPATIENT
Start: 2024-02-22 | End: 2024-02-22 | Stop reason: HOSPADM

## 2024-02-22 RX ORDER — SODIUM CHLORIDE 0.9 % (FLUSH) 0.9 %
10 SYRINGE (ML) INJECTION AS NEEDED
Status: DISCONTINUED | OUTPATIENT
Start: 2024-02-22 | End: 2024-02-22 | Stop reason: HOSPADM

## 2024-02-22 RX ORDER — SODIUM CHLORIDE, SODIUM LACTATE, POTASSIUM CHLORIDE, CALCIUM CHLORIDE 600; 310; 30; 20 MG/100ML; MG/100ML; MG/100ML; MG/100ML
9 INJECTION, SOLUTION INTRAVENOUS CONTINUOUS PRN
Status: DISCONTINUED | OUTPATIENT
Start: 2024-02-22 | End: 2024-02-22 | Stop reason: HOSPADM

## 2024-02-22 RX ORDER — PROPOFOL 10 MG/ML
VIAL (ML) INTRAVENOUS AS NEEDED
Status: DISCONTINUED | OUTPATIENT
Start: 2024-02-22 | End: 2024-02-22 | Stop reason: SURG

## 2024-02-22 RX ORDER — SODIUM CHLORIDE 9 MG/ML
40 INJECTION, SOLUTION INTRAVENOUS AS NEEDED
Status: DISCONTINUED | OUTPATIENT
Start: 2024-02-22 | End: 2024-02-22 | Stop reason: HOSPADM

## 2024-02-22 RX ORDER — SODIUM CHLORIDE, SODIUM LACTATE, POTASSIUM CHLORIDE, CALCIUM CHLORIDE 600; 310; 30; 20 MG/100ML; MG/100ML; MG/100ML; MG/100ML
100 INJECTION, SOLUTION INTRAVENOUS ONCE
Status: DISCONTINUED | OUTPATIENT
Start: 2024-02-22 | End: 2024-02-22 | Stop reason: HOSPADM

## 2024-02-22 RX ORDER — MAGNESIUM HYDROXIDE 1200 MG/15ML
LIQUID ORAL AS NEEDED
Status: DISCONTINUED | OUTPATIENT
Start: 2024-02-22 | End: 2024-02-22 | Stop reason: HOSPADM

## 2024-02-22 RX ADMIN — LIDOCAINE HYDROCHLORIDE 80 MG: 20 INJECTION, SOLUTION INFILTRATION; PERINEURAL at 10:44

## 2024-02-22 RX ADMIN — PROPOFOL 100 MG: 10 INJECTION, EMULSION INTRAVENOUS at 10:44

## 2024-02-22 RX ADMIN — PROPOFOL 50 MG: 10 INJECTION, EMULSION INTRAVENOUS at 10:59

## 2024-02-22 RX ADMIN — PROPOFOL 50 MG: 10 INJECTION, EMULSION INTRAVENOUS at 10:49

## 2024-02-22 RX ADMIN — PROPOFOL 50 MG: 10 INJECTION, EMULSION INTRAVENOUS at 10:50

## 2024-02-22 RX ADMIN — PROPOFOL 50 MG: 10 INJECTION, EMULSION INTRAVENOUS at 10:52

## 2024-02-22 RX ADMIN — SODIUM CHLORIDE, POTASSIUM CHLORIDE, SODIUM LACTATE AND CALCIUM CHLORIDE 9 ML/HR: 600; 310; 30; 20 INJECTION, SOLUTION INTRAVENOUS at 09:44

## 2024-02-22 RX ADMIN — PROPOFOL 50 MG: 10 INJECTION, EMULSION INTRAVENOUS at 10:56

## 2024-02-22 RX ADMIN — PROPOFOL 50 MG: 10 INJECTION, EMULSION INTRAVENOUS at 11:07

## 2024-02-22 RX ADMIN — PROPOFOL 50 MG: 10 INJECTION, EMULSION INTRAVENOUS at 11:05

## 2024-02-22 RX ADMIN — PROPOFOL 50 MG: 10 INJECTION, EMULSION INTRAVENOUS at 10:47

## 2024-02-22 RX ADMIN — PROPOFOL 50 MG: 10 INJECTION, EMULSION INTRAVENOUS at 10:45

## 2024-02-22 RX ADMIN — PROPOFOL 50 MG: 10 INJECTION, EMULSION INTRAVENOUS at 11:12

## 2024-02-22 RX ADMIN — PROPOFOL 50 MG: 10 INJECTION, EMULSION INTRAVENOUS at 11:02

## 2024-02-22 RX ADMIN — PROPOFOL 50 MG: 10 INJECTION, EMULSION INTRAVENOUS at 11:10

## 2024-02-22 NOTE — ANESTHESIA PREPROCEDURE EVALUATION
Anesthesia Evaluation     Patient summary reviewed and Nursing notes reviewed   history of anesthetic complications:  PONV  NPO Solid Status: > 8 hours  NPO Liquid Status: > 8 hours           Airway   Mallampati: I  TM distance: >3 FB  Neck ROM: full  No difficulty expected  Dental - normal exam     Pulmonary - negative pulmonary ROS and normal exam    breath sounds clear to auscultation  (-) not a smoker  Cardiovascular - normal exam  Exercise tolerance: good (4-7 METS)    Rhythm: regular  Rate: normal    (-) hypertension (Post-partum pre-eclampsia)      Neuro/Psych  (+) headaches (Migraines, takes Imitrex - last migraine last month), psychiatric history Anxiety  GI/Hepatic/Renal/Endo      ROS Comment: IBS    Musculoskeletal (-) negative ROS    Abdominal  - normal exam    Abdomen: soft.  Bowel sounds: normal.   Substance History - negative use  (-) alcohol use, drug use     OB/GYN negative ob/gyn ROS         Other - negative ROS                         Anesthesia Plan    ASA 2     MAC     intravenous induction     Anesthetic plan, risks, benefits, and alternatives have been provided, discussed and informed consent has been obtained with: patient and spouse/significant other.    Use of blood products discussed with patient and spouse/significant other .        CODE STATUS:

## 2024-02-22 NOTE — DISCHARGE INSTRUCTIONS
Colonoscopy, Adult, Care After  This sheet gives you information about how to care for yourself after your procedure. Your doctor may also give you more specific instructions. If you have problems or questions, call your doctor.  What can I expect after the procedure?  After the procedure, it is common to have:  A small amount of blood in your poop for 24 hours.  Some gas.  Mild cramping or bloating in your belly.  Follow these instructions at home:  General instructions    ***************DO NOT DRIVE TODAY*******************    For the first 24 hours after the procedure:  Do not sign important documents.  Do not drink alcohol.  Do your daily activities more slowly than normal.  Eat foods that are soft and easy to digest.  Take over-the-counter or prescription medicines only as told by your doctor.  To help cramping and bloating:       Try walking around.  Put heat on your belly (abdomen) as told by your doctor. Use a heat source that your doctor recommends, such as a moist heat pack or a heating pad.  Put a towel between your skin and the heat source.  Leave the heat on for 20-30 minutes.  Remove the heat if your skin turns bright red. This is especially important if you cannot feel pain, heat, or cold. You can get burned.  Eating and drinking  Upper Endoscopy, Adult, Care After  This sheet gives you information about how to care for yourself after your procedure. Your health care provider may also give you more specific instructions. If you have problems or questions, contact your health care provider.  What can I expect after the procedure?  After the procedure, it is common to have:  A sore throat.  Mild stomach pain or discomfort.  Bloating.  Nausea.  Follow these instructions at home:       Follow instructions from your health care provider about what to eat or drink after your procedure.  Return to your normal activities as told by your health care provider. Ask your health care provider what activities are  safe for you.  Take over-the-counter and prescription medicines only as told by your health care provider.  DO NOT DRIVE FOR THE REST OF TODAY if you were given a sedative during your procedure.  Keep all follow-up visits as told by your health care provider. This is important.  Contact a health care provider if you have:  A sore throat that lasts longer than one day.  Trouble swallowing.  Get help right away if:  You vomit blood or your vomit looks like coffee grounds.  You have:  A fever.  Bloody, black, or tarry stools.  A severe sore throat or you cannot swallow.  Difficulty breathing.  Severe pain in your chest or abdomen.  Summary  After the procedure, it is common to have a sore throat, mild stomach discomfort, bloating, and nausea.  Do not drive TODAY if you were given a sedative during the procedure.  Follow instructions from your health care provider about what to eat or drink after your procedure.  Return to your normal activities as told by your health care provider.  This information is not intended to replace advice given to you by your health care provider. Make sure you discuss any questions you have with your health care provider.  Document Released: 06/18/2013 Document Revised: 05/20/2019 Document Reviewed: 05/20/2019  [employer] Interactive Patient Education © 2019 [employer]

## 2024-02-22 NOTE — H&P
Patient Care Team:  Rachna Bolaños MD as PCP - General (Internal Medicine)    CHIEF COMPLAINT:  nausea and constipation    HISTORY OF PRESENT ILLNESS:  EGD for nausea.   C/s for constipation.     Past Medical History:   Diagnosis Date    Acute dysfunction of left eustachian tube 3/9/2023    Calculus of gallbladder without cholecystitis without obstruction 09/30/2019    sched lap elicia    Depression affecting pregnancy     History of epistaxis     had to have cauterized in [facility]    Hypotension     Migraines     PONV (postoperative nausea and vomiting)      Past Surgical History:   Procedure Laterality Date    BREAST SURGERY Left 2012    excision left breast mass    CAUTERIZATION NASAL BLEEDERS      in college d/t frequent nose bleeds, thinks 2012    CHOLECYSTECTOMY N/A 10/7/2019    Procedure: CHOLECYSTECTOMY LAPAROSCOPIC;  Surgeon: Naga Fournier MD;  Location: [facility];  Service: General     Family History   Problem Relation Age of Onset    Heart disease Father     Heart attack Father     Diabetes Father     Hypertension Father     Breast cancer Maternal Grandmother     Colon cancer Maternal Grandmother     Endometrial cancer Maternal Grandmother     Cancer Maternal Grandmother     Diverticulitis Mother     Heart disease Mother     Hypertension Mother     Cancer Maternal Grandfather     Cancer Paternal Grandfather     Malig Hyperthermia Neg Hx      Social History     Tobacco Use    Smoking status: Never    Smokeless tobacco: Never   Vaping Use    Vaping Use: Never used   Substance Use Topics    Alcohol use: No    Drug use: No     Medications Prior to Admission   Medication Sig Dispense Refill Last Dose    SUMAtriptan (Imitrex) 100 MG tablet Take one tablet by mouth at onset of headache. May repeat dose one time in 2 hours if headache not relieved. 16 tablet 6 Past Month    omeprazole (priLOSEC) 40 MG capsule Take 1 capsule by mouth Daily. 90 capsule 3 2/20/2024    ondansetron ODT (ZOFRAN-ODT) 4 MG  disintegrating tablet Place 1 tablet on the tongue Every 8 (Eight) Hours As Needed for Nausea or Vomiting. 30 tablet 0 Unknown    Plecanatide (Trulance) 3 MG tablet Take 1 tablet by mouth Daily. 30 tablet 11 2/20/2024    sertraline (ZOLOFT) 100 MG tablet Take 1 tablet by mouth Daily. 90 tablet 3 2/20/2024     Allergies:  Patient has no known allergies.    REVIEW OF SYSTEMS:  Please see the above history of present illness for pertinent positives and negatives.  The remainder of the patient's systems have been reviewed and are negative.     Vital Signs  Temp:  [98.4 °F (36.9 °C)] 98.4 °F (36.9 °C)  Heart Rate:  [72] 72  Resp:  [12] 12  BP: (125)/(87) 125/87    Flowsheet Rows      Flowsheet Row First Filed Value   Admission Height --   Admission Weight 68.7 kg (151 lb 6.4 oz) Documented at 02/22/2024 0925             Physical Exam:  Physical Exam   Constitutional: Patient appears well-developed and well-nourished and in no acute distress   HEENT:   Head: Normocephalic and atraumatic.   Eyes:  Pupils are equal, round, and reactive to light. EOM are intact. Sclerae are anicteric and non-injected.  Mouth and Throat: Patient has moist mucous membranes. Oropharynx is clear of any erythema or exudate.     Neck: Neck supple. No JVD present. No thyromegaly present. No lymphadenopathy present.  Cardiovascular: Regular rate, regular rhythm, S1 normal and S2 normal.  Exam reveals no gallop and no friction rub.  No murmur heard.  Pulmonary/Chest: Lungs are clear to auscultation bilaterally. No respiratory distress. No wheezes. No rhonchi. No rales.   Abdominal: Soft. Bowel sounds are normal. No distension and no mass. There is no hepatosplenomegaly. There is no tenderness.   Musculoskeletal: Normal Muscle tone  Extremities: No edema. Pulses are palpable in all 4 extremities.  Neurological: Patient is alert and oriented to person, place, and time. Cranial nerves II-XII are grossly intact with no focal deficits.  Skin: Skin is  warm. No rash noted. Nails show no clubbing.  No cyanosis or erythema.    Debilities/Disabilities Identified: None  Emotional Behavior: Appropriate     Results Review:   I reviewed the patient's new clinical results.    Lab Results (most recent)       None            Imaging Results (Most Recent)       None          reviewed    ECG/EMG Results (most recent)       None          reviewed    Assessment & Plan   Nausea and constipation /  EGD and colonoscopy      I discussed the patient's findings and my recommendations with patient.     Vargas Cade MD  02/22/24  10:42 EST    Time: 10 min prior to procedure.

## 2024-02-22 NOTE — ANESTHESIA POSTPROCEDURE EVALUATION
Patient: Taryn Terry    Procedure Summary       Date: 02/22/24 Room / Location: [facility] ENDOSCOPY 1 /  [facility] OR    Anesthesia Start: 1037 Anesthesia Stop: 1117    Procedures:       ESOPHAGOGASTRODUODENOSCOPY WITH BIOPSY (Esophagus)      COLONOSCOPY Diagnosis:       Irritable bowel syndrome with constipation      Nausea      (Irritable bowel syndrome with constipation [K58.1])      (Nausea [R11.0])    Surgeons: Vargas Cade MD Provider: Марина Burleson CRNA    Anesthesia Type: MAC ASA Status: 2            Anesthesia Type: MAC    Vitals  Vitals Value Taken Time   BP 93/62 02/22/24 1130   Temp     Pulse 66 02/22/24 1131   Resp 16 02/22/24 1118   SpO2 96 % 02/22/24 1131   Vitals shown include unfiled device data.        Post Anesthesia Care and Evaluation    Patient location during evaluation: PHASE II  Patient participation: complete - patient participated  Level of consciousness: awake  Pain management: adequate    Airway patency: patent  Anesthetic complications: No anesthetic complications  PONV Status: none  Cardiovascular status: acceptable  Respiratory status: acceptable  Hydration status: acceptable

## 2024-02-23 LAB
LAB AP CASE REPORT: NORMAL
LAB AP SPECIAL STAINS: NORMAL
PATH REPORT.FINAL DX SPEC: NORMAL
PATH REPORT.GROSS SPEC: NORMAL

## 2024-02-29 NOTE — PROGRESS NOTES
- EGD for nausea.   - Findings/path: gastritis (biopsied -- negative for H Pylori), sub-cm gastric polyp removed (path consistent with fundic gland polyp which is benign), duodenitis  - POC: nausea is likely due to mild gastritis and duodenitis seen.  Started on Omeprazle 40 mg QD for 1 to 2 months to treat mild gastritis and duodenitis seen.    - C/s for constipation   - Findings: external hemorrhoids   - POC: No masses or strictures seen. Etiology of constipation is likely due to IBS-C. Continue Trulance 3 mg QD. Resume screening colonoscopy at 47 years old.

## 2024-04-04 ENCOUNTER — OFFICE VISIT (OUTPATIENT)
Dept: GASTROENTEROLOGY | Facility: CLINIC | Age: 34
End: 2024-04-04
Payer: COMMERCIAL

## 2024-04-04 VITALS
DIASTOLIC BLOOD PRESSURE: 82 MMHG | BODY MASS INDEX: 26.94 KG/M2 | SYSTOLIC BLOOD PRESSURE: 130 MMHG | WEIGHT: 157.8 LBS | HEIGHT: 64 IN

## 2024-04-04 DIAGNOSIS — K58.1 IRRITABLE BOWEL SYNDROME WITH CONSTIPATION: Primary | ICD-10-CM

## 2024-04-04 DIAGNOSIS — K29.30 CHRONIC SUPERFICIAL GASTRITIS WITHOUT BLEEDING: ICD-10-CM

## 2024-04-04 RX ORDER — ONDANSETRON 4 MG/1
4 TABLET, ORALLY DISINTEGRATING ORAL EVERY 8 HOURS PRN
Qty: 30 TABLET | Refills: 0 | Status: SHIPPED | OUTPATIENT
Start: 2024-04-04

## 2024-04-04 NOTE — PROGRESS NOTES
PATIENT INFORMATION  Taryn Terry       - 1990    CHIEF COMPLAINT  Chief Complaint   Patient presents with    Irritable Bowel Syndrome    Gastritis     Duodenitis       HISTORY OF PRESENT ILLNESS  Here today for EGD and Colon follow-up    2024 Last EGD and Colon for nausea, positive for gastritis, fundic gland polyps, duodenitis, no HP. Colon with external hemorrhoids, no polyps. Recommended continuing daily omeprazole and trulance. Start screening colons at 47. Reviewed path, recommendations, images with patient.     Vomited part of prep and did ok with 2nd round.    Still not very tolerant to spicy or fried foods, will have bouts of nausea, longstanding GB out.    At LOV was still having some urgency with trulance in mornings, reviewed adding daily fiber supplement. Linzess was too strong. Now off trulance, miralax and 2 scoops of benefiber, now daily and not straining, not hard, no discomfort.    10/7/2019 cholecystectomy, no stones    Irritable Bowel Syndrome  Pertinent negatives include no abdominal pain, nausea or vomiting.       REVIEWED PERTINENT RESULTS/ LABS  Lab Results   Component Value Date    CASEREPORT  2024     Surgical Pathology Report                         Case: [id number]                                  Authorizing Provider:  Vargas Cade MD       Collected:           2024 10:48 AM          Ordering Location:     [facility]   Received:            2024 12:28 PM                                 OR                                                                           Pathologist:           Indu Barth MD                                                          Specimens:   1) - Gastric, Body, R/O H. Pylori                                                                   2) - Gastric, Body, polyp                                                                  FINALDX  2024     1.  Stomach, biopsy: Oxyntic and antral type gastric  "mucosa with   A. Mild reactive/chemical gastropathy; no significant inflammation.   B. No metaplasia, dysplasia nor malignancy.   C. No H. pylori-like organisms identified by routine or immunohistochemical staining.    2.  Stomach, \"polyp\", biopsy: Oxyntic type gastric mucosa with   A. Fragments of fundic gland polyp.    B. No significant inflammation, metaplasia, dysplasia nor malignancy.   C. No H. pylori-like organisms identified.       Lab Results   Component Value Date    HGB 13.1 12/13/2023    MCV 91.5 12/13/2023     12/13/2023    ALT 29 12/22/2023    AST 25 12/22/2023    HGBA1C 5.3 10/10/2023    TRIG 186 (H) 05/11/2023      No results found.    REVIEW OF SYSTEMS  Review of Systems   Constitutional: Negative.    HENT: Negative.     Eyes: Negative.    Respiratory: Negative.     Cardiovascular: Negative.    Gastrointestinal:  Negative for abdominal pain, constipation, diarrhea, nausea and vomiting.        IBS, Gastritis, Duodenitis   Endocrine: Negative.    Genitourinary: Negative.    Musculoskeletal: Negative.    Skin: Negative.    Allergic/Immunologic: Negative.    Neurological: Negative.    Hematological: Negative.    Psychiatric/Behavioral: Negative.           ACTIVE PROBLEMS  Patient Active Problem List    Diagnosis     Irritable bowel syndrome with constipation [K58.1]     Nausea [R11.0]     Transaminitis [R74.01]     Right upper quadrant abdominal pain [R10.11]     Other constipation [K59.09]     Allergic sinusitis [J30.9]     History of preeclampsia [O14.90]     Migraine without aura and without status migrainosus, not intractable [G43.009]     Generalized anxiety disorder [F41.1]          PAST MEDICAL HISTORY  Past Medical History:   Diagnosis Date    Acute dysfunction of left eustachian tube 3/9/2023    Calculus of gallbladder without cholecystitis without obstruction 09/30/2019    sched lap elicia    Depression affecting pregnancy     History of epistaxis     had to have cauterized in college    " Hypotension     Migraines     PONV (postoperative nausea and vomiting)          SURGICAL HISTORY  Past Surgical History:   Procedure Laterality Date    BREAST SURGERY Left 2012    excision left breast mass    CAUTERIZATION NASAL BLEEDERS      in college d/t frequent nose bleeds, thinks 2012    CHOLECYSTECTOMY N/A 10/7/2019    Procedure: CHOLECYSTECTOMY LAPAROSCOPIC;  Surgeon: Naga Fournier MD;  Location: [facility] OR;  Service: General    COLONOSCOPY N/A 2/22/2024    Procedure: COLONOSCOPY;  Surgeon: Vargas Cade MD;  Location: [facility] OR;  Service: Gastroenterology;  Laterality: N/A;  External hemorrhoids    ENDOSCOPY N/A 2/22/2024    Procedure: ESOPHAGOGASTRODUODENOSCOPY WITH BIOPSY;  Surgeon: Vargas Cade MD;  Location: [facility] OR;  Service: Gastroenterology;  Laterality: N/A;  Duodenitits; Gastritis; gastric polyp         FAMILY HISTORY  Family History   Problem Relation Age of Onset    Heart disease Father     Heart attack Father     Diabetes Father     Hypertension Father     Breast cancer Maternal Grandmother     Colon cancer Maternal Grandmother     Endometrial cancer Maternal Grandmother     Cancer Maternal Grandmother     Diverticulitis Mother     Heart disease Mother     Hypertension Mother     Cancer Maternal Grandfather     Cancer Paternal Grandfather     Malig Hyperthermia Neg Hx          SOCIAL HISTORY  Social History     Occupational History    Not on file   Tobacco Use    Smoking status: Never    Smokeless tobacco: Never   Vaping Use    Vaping status: Never Used   Substance and Sexual Activity    Alcohol use: No    Drug use: No    Sexual activity: Yes     Partners: Male     Birth control/protection: Pill, Other         CURRENT MEDICATIONS    Current Outpatient Medications:     omeprazole (priLOSEC) 40 MG capsule, Take 1 capsule by mouth Daily., Disp: 90 capsule, Rfl: 3    ondansetron ODT (ZOFRAN-ODT) 4 MG disintegrating tablet, Place 1 tablet on the tongue Every 8 (Eight) Hours As Needed for  "Nausea or Vomiting., Disp: 30 tablet, Rfl: 0    sertraline (ZOLOFT) 100 MG tablet, Take 1 tablet by mouth Daily., Disp: 90 tablet, Rfl: 3    SUMAtriptan (Imitrex) 100 MG tablet, Take one tablet by mouth at onset of headache. May repeat dose one time in 2 hours if headache not relieved., Disp: 16 tablet, Rfl: 6    ALLERGIES  Patient has no known allergies.    VITALS  Vitals:    04/04/24 1303   BP: 130/82   BP Location: Left arm   Patient Position: Sitting   Cuff Size: Large Adult   Weight: 71.6 kg (157 lb 12.8 oz)   Height: 163.8 cm (64.49\")       PHYSICAL EXAM  Debilities/Disabilities Identified: None  Emotional Behavior: Appropriate  Wt Readings from Last 3 Encounters:   04/04/24 71.6 kg (157 lb 12.8 oz)   02/22/24 68.7 kg (151 lb 6.4 oz)   02/12/24 72.2 kg (159 lb 3.2 oz)     Ht Readings from Last 1 Encounters:   04/04/24 163.8 cm (64.49\")     Body mass index is 26.68 kg/m².  Physical Exam  Constitutional:       General: She is not in acute distress.     Appearance: Normal appearance. She is not ill-appearing.   HENT:      Head: Normocephalic and atraumatic.      Mouth/Throat:      Mouth: Mucous membranes are moist.      Pharynx: No posterior oropharyngeal erythema.   Eyes:      General: No scleral icterus.  Cardiovascular:      Rate and Rhythm: Normal rate and regular rhythm.      Heart sounds: Normal heart sounds.   Pulmonary:      Effort: Pulmonary effort is normal.      Breath sounds: Normal breath sounds.   Abdominal:      General: Abdomen is flat. Bowel sounds are normal. There is no distension.      Palpations: Abdomen is soft. There is no mass.      Tenderness: There is no abdominal tenderness in the left upper quadrant. There is no guarding or rebound. Negative signs include Yanez's sign.      Hernia: No hernia is present.   Musculoskeletal:      Cervical back: Neck supple.   Skin:     General: Skin is warm.      Capillary Refill: Capillary refill takes less than 2 seconds.   Neurological:      General: " No focal deficit present.      Mental Status: She is alert and oriented to person, place, and time.   Psychiatric:         Mood and Affect: Mood normal.         Behavior: Behavior normal.         Thought Content: Thought content normal.         Judgment: Judgment normal.         CLINICAL DATA REVIEWED   reviewed previous lab results and integrated with today's visit, reviewed notes from other physicians and/or last GI encounter, reviewed previous endoscopy results and available photos, reviewed surgical pathology results from previous biopsies    ASSESSMENT  Diagnoses and all orders for this visit:    Irritable bowel syndrome with constipation    Chronic superficial gastritis without bleeding    Other orders  -     ondansetron ODT (ZOFRAN-ODT) 4 MG disintegrating tablet; Place 1 tablet on the tongue Every 8 (Eight) Hours As Needed for Nausea or Vomiting.          PLAN    Continue daily omeprazole  Pretreat famotidine  Continue daily miralax and benefiber  Begin screening colons at 47    Return in about 1 year (around 4/4/2025).    I have discussed the above plan with the patient.  They verbalize understanding and are in agreement with the plan.  They have been advised to contact the office for any questions, concerns, or changes related to their health.

## 2024-04-30 ENCOUNTER — PATIENT MESSAGE (OUTPATIENT)
Dept: INTERNAL MEDICINE | Facility: CLINIC | Age: 34
End: 2024-04-30
Payer: COMMERCIAL

## 2024-04-30 DIAGNOSIS — B00.1 RECURRENT COLD SORES: Primary | ICD-10-CM

## 2024-04-30 RX ORDER — VALACYCLOVIR HYDROCHLORIDE 500 MG/1
500 TABLET, FILM COATED ORAL 2 TIMES DAILY
Qty: 30 TABLET | Refills: 1 | Status: SHIPPED | OUTPATIENT
Start: 2024-04-30

## 2024-04-30 NOTE — TELEPHONE ENCOUNTER
From: Taryn Terry  To: Rachna Bolaños  Sent: 2024 7:47 AM EDT  Subject: Fever Blister :(     Hi Dr. Bolaños,     I woke up this morning with a fever blister. I’ve had a prescription for a valtrex on and off as needed but it has . I was hoping you could prescribe it or whatever you think is best for fever blisters?

## 2024-07-05 ENCOUNTER — OFFICE VISIT (OUTPATIENT)
Dept: INTERNAL MEDICINE | Facility: CLINIC | Age: 34
End: 2024-07-05
Payer: COMMERCIAL

## 2024-07-05 VITALS
HEART RATE: 86 BPM | HEIGHT: 65 IN | SYSTOLIC BLOOD PRESSURE: 130 MMHG | TEMPERATURE: 97.7 F | DIASTOLIC BLOOD PRESSURE: 92 MMHG | WEIGHT: 159.6 LBS | OXYGEN SATURATION: 98 % | BODY MASS INDEX: 26.59 KG/M2

## 2024-07-05 DIAGNOSIS — Z00.00 ANNUAL PHYSICAL EXAM: Primary | ICD-10-CM

## 2024-07-05 PROCEDURE — 99395 PREV VISIT EST AGE 18-39: CPT | Performed by: INTERNAL MEDICINE

## 2024-07-05 NOTE — PROGRESS NOTES
"Chief Complaint  Annual Exam    Subjective        Taryn Terry presents to [facility]  History of Present Illness    Discussed weight trends and concerns    Objective   Vital Signs:  /92 (BP Location: Left arm, Patient Position: Sitting, Cuff Size: Adult)   Pulse 86   Temp 97.7 °F (36.5 °C) (Infrared)   Ht 163.8 cm (64.5\")   Wt 72.4 kg (159 lb 9.6 oz)   SpO2 98%   BMI 26.97 kg/m²   Estimated body mass index is 26.97 kg/m² as calculated from the following:    Height as of this encounter: 163.8 cm (64.5\").    Weight as of this encounter: 72.4 kg (159 lb 9.6 oz).               Physical Exam  Vitals reviewed.   Constitutional:       General: She is not in acute distress.     Appearance: Normal appearance.   HENT:      Head: Normocephalic and atraumatic.      Nose: Nose normal.      Mouth/Throat:      Mouth: Mucous membranes are moist.   Eyes:      Conjunctiva/sclera: Conjunctivae normal.   Cardiovascular:      Rate and Rhythm: Normal rate and regular rhythm.      Pulses: Normal pulses.      Heart sounds: Normal heart sounds.   Pulmonary:      Effort: Pulmonary effort is normal.      Breath sounds: Normal breath sounds.   Skin:     General: Skin is warm and dry.   Neurological:      General: No focal deficit present.      Mental Status: She is alert.   Psychiatric:         Mood and Affect: Mood normal.        Result Review :    The following data was reviewed by: Rachna Bolaños MD on 07/05/2024:  Common labs          12/11/2023    11:14 12/13/2023    10:29 12/22/2023    10:56   Common Labs   Glucose 86  92  85    BUN 7  8  12    Creatinine 0.69  0.79  0.76    Sodium 138  137  141    Potassium 3.9  4.9  4.5    Chloride 107  106  107    Calcium 9.0  9.2  9.5    Albumin 3.9  4.0  4.3    Total Bilirubin 0.4  0.4  0.4    Alkaline Phosphatase 88  80  71    AST (SGOT) 282  50  25    ALT (SGPT) 169  69  29    WBC 7.81  6.70     Hemoglobin 13.0  13.1     Hematocrit 38.8  39.6   "   Platelets 268  265       Data reviewed : no new data to review             Assessment and Plan     Diagnoses and all orders for this visit:    1. Annual physical exam (Primary)      Reviewed all pertinent vaccines for age and discussed healthy weight, exercise.  Patient will be screened with above labs and had physical exam and history taken.  Discussed ways to improve ASCVD health and general mental/physical health.     Has been more consistently active, eating better, watching portion size as well.  Felt like she did well May and June in terms of consistency.  Thinking about contacting Bailey Brumfield to see about a lifestyle plan.            Follow Up     Return in about 1 year (around 7/5/2025) for Annual physical.  Patient was given instructions and counseling regarding her condition or for health maintenance advice. Please see specific information pulled into the AVS if appropriate.

## 2024-07-29 DIAGNOSIS — F41.1 GENERALIZED ANXIETY DISORDER: ICD-10-CM

## 2024-07-30 RX ORDER — SERTRALINE HYDROCHLORIDE 100 MG/1
100 TABLET, FILM COATED ORAL DAILY
Qty: 90 TABLET | Refills: 3 | Status: SHIPPED | OUTPATIENT
Start: 2024-07-30

## 2024-08-01 ENCOUNTER — PATIENT MESSAGE (OUTPATIENT)
Dept: INTERNAL MEDICINE | Facility: CLINIC | Age: 34
End: 2024-08-01
Payer: COMMERCIAL

## 2024-08-01 DIAGNOSIS — N81.89 PELVIC FLOOR WEAKNESS: Primary | ICD-10-CM

## 2024-08-08 NOTE — TELEPHONE ENCOUNTER
From: Taryn Terry  To: Rachna Bolaños  Sent: 8/1/2024 9:17 AM EDT  Subject: Pelvic Floor PT    Hi Dr. Bolaños,     After talking with Dr. Brumfield yesterday one of my follow up goals was to ask you for a referral to someone who specializes in pelvic floor pt and can help me get my core back in shape and ready for baby #2.

## 2024-10-10 ENCOUNTER — FLU SHOT (OUTPATIENT)
Dept: INTERNAL MEDICINE | Facility: CLINIC | Age: 34
End: 2024-10-10
Payer: COMMERCIAL

## 2024-10-10 DIAGNOSIS — Z23 NEED FOR INFLUENZA VACCINATION: Primary | ICD-10-CM

## 2024-10-10 PROCEDURE — 90656 IIV3 VACC NO PRSV 0.5 ML IM: CPT | Performed by: INTERNAL MEDICINE

## 2024-10-10 PROCEDURE — 90471 IMMUNIZATION ADMIN: CPT | Performed by: INTERNAL MEDICINE

## 2025-02-28 RX ORDER — ONDANSETRON 4 MG/1
4 TABLET, ORALLY DISINTEGRATING ORAL EVERY 8 HOURS PRN
Qty: 30 TABLET | Refills: 0 | Status: SHIPPED | OUTPATIENT
Start: 2025-02-28

## 2025-02-28 RX ORDER — OMEPRAZOLE 40 MG/1
40 CAPSULE, DELAYED RELEASE ORAL DAILY
Qty: 90 CAPSULE | Refills: 0 | Status: SHIPPED | OUTPATIENT
Start: 2025-02-28

## 2025-03-24 DIAGNOSIS — F41.1 GENERALIZED ANXIETY DISORDER: ICD-10-CM

## 2025-03-24 RX ORDER — SERTRALINE HYDROCHLORIDE 100 MG/1
100 TABLET, FILM COATED ORAL DAILY
Qty: 90 TABLET | Refills: 1 | Status: SHIPPED | OUTPATIENT
Start: 2025-03-24

## 2025-04-04 ENCOUNTER — OFFICE VISIT (OUTPATIENT)
Dept: GASTROENTEROLOGY | Facility: CLINIC | Age: 35
End: 2025-04-04
Payer: COMMERCIAL

## 2025-04-04 VITALS
WEIGHT: 167.4 LBS | BODY MASS INDEX: 27.89 KG/M2 | DIASTOLIC BLOOD PRESSURE: 82 MMHG | SYSTOLIC BLOOD PRESSURE: 110 MMHG | HEIGHT: 65 IN

## 2025-04-04 DIAGNOSIS — K29.30 CHRONIC SUPERFICIAL GASTRITIS WITHOUT BLEEDING: ICD-10-CM

## 2025-04-04 DIAGNOSIS — K58.1 IRRITABLE BOWEL SYNDROME WITH CONSTIPATION: Primary | ICD-10-CM

## 2025-04-04 RX ORDER — POLYETHYLENE GLYCOL 3350 17 G/17G
17 POWDER, FOR SOLUTION ORAL EVERY MORNING
COMMUNITY

## 2025-04-04 RX ORDER — SUCRALFATE 1 G/1
1 TABLET ORAL
Qty: 90 TABLET | Refills: 3 | Status: SHIPPED | OUTPATIENT
Start: 2025-04-04

## 2025-04-04 RX ORDER — OMEPRAZOLE 40 MG/1
40 CAPSULE, DELAYED RELEASE ORAL DAILY
Qty: 90 CAPSULE | Refills: 0 | Status: SHIPPED | OUTPATIENT
Start: 2025-04-04

## 2025-04-04 NOTE — PROGRESS NOTES
PATIENT INFORMATION  Taryn Terry       - 1990    CHIEF COMPLAINT  Chief Complaint   Patient presents with    Irritable Bowel Syndrome    Constipation    Gastritis       HISTORY OF PRESENT ILLNESS  Here today for Gastritis, IBS-C follow-up     Per LOV:  On 40 mg omeprazole daily. Still not very tolerant to spicy or fried foods, will have bouts of nausea, longstanding GB out. TODAY: Randomly has tightening of epigastric area is rather infrequent, but can happen back to back. Diet is a trigger like something super greasy.  Reviewed pretreating triggers. Responds to heating pad, has not tried taking AA in moment.     IBS-C: Miralax and 2 scoops of benefiber, now daily and not straining, not hard, no discomfort, still doing well, daily BM which is easy, well controlled, no bleeding or straining.  Trulance: Caused urgency  Linzess: Too strong. Now off trulance.     10/7/2019 cholecystectomy, no stones  2024 Last EGD and Colon for nausea, positive for gastritis, fundic gland polyps, duodenitis, no HP. Colon with external hemorrhoids, no polyps. Recommended continuing daily omeprazole and trulance. Start screening colons at 45.           REVIEWED PERTINENT RESULTS/ LABS  Lab Results   Component Value Date    CASEREPORT  2024     Surgical Pathology Report                         Case: [id number]                                  Authorizing Provider:  Vargas Cade MD       Collected:           2024 10:48 AM          Ordering Location:     [facility]   Received:            2024 12:28 PM                                 OR                                                                           Pathologist:           Indu Barth MD                                                          Specimens:   1) - Gastric, Body, R/O H. Pylori                                                                   2) - Gastric, Body, polyp                                                "                   FINALDX  02/22/2024     1.  Stomach, biopsy: Oxyntic and antral type gastric mucosa with   A. Mild reactive/chemical gastropathy; no significant inflammation.   B. No metaplasia, dysplasia nor malignancy.   C. No H. pylori-like organisms identified by routine or immunohistochemical staining.    2.  Stomach, \"polyp\", biopsy: Oxyntic type gastric mucosa with   A. Fragments of fundic gland polyp.    B. No significant inflammation, metaplasia, dysplasia nor malignancy.   C. No H. pylori-like organisms identified.       Lab Results   Component Value Date    HGB 13.1 12/13/2023    MCV 91.5 12/13/2023     12/13/2023    ALT 29 12/22/2023    AST 25 12/22/2023    HGBA1C 5.3 10/10/2023    TRIG 186 (H) 05/11/2023      No results found.    REVIEW OF SYSTEMS  Review of Systems      ACTIVE PROBLEMS  Patient Active Problem List    Diagnosis     Irritable bowel syndrome with constipation [K58.1]     Nausea [R11.0]     Transaminitis [R74.01]     Right upper quadrant abdominal pain [R10.11]     Other constipation [K59.09]     Allergic sinusitis [J30.9]     History of preeclampsia [O14.90]     Migraine without aura and without status migrainosus, not intractable [G43.009]     Generalized anxiety disorder [F41.1]          PAST MEDICAL HISTORY  Past Medical History:   Diagnosis Date    Acute dysfunction of left eustachian tube 03/09/2023    Calculus of gallbladder without cholecystitis without obstruction 09/30/2019    sched lap elicia    Depression affecting pregnancy     History of epistaxis     had to have cauterized in college    Hypotension     Irritable bowel syndrome     Migraines     PONV (postoperative nausea and vomiting)          SURGICAL HISTORY  Past Surgical History:   Procedure Laterality Date    BREAST SURGERY Left 2012    excision left breast mass    CAUTERIZATION NASAL BLEEDERS      in college d/t frequent nose bleeds, thinks 2012    CHOLECYSTECTOMY N/A 10/07/2019    Procedure: CHOLECYSTECTOMY " LAPAROSCOPIC;  Surgeon: Naga Fournier MD;  Location:  [facility];  Service: General    COLONOSCOPY N/A 02/22/2024    Procedure: COLONOSCOPY;  Surgeon: Vargas Cade MD;  Location:  [facility] OR;  Service: Gastroenterology;  Laterality: N/A;  External hemorrhoids    ENDOSCOPY N/A 02/22/2024    Procedure: ESOPHAGOGASTRODUODENOSCOPY WITH BIOPSY;  Surgeon: Vargas Cade MD;  Location:  [facility] OR;  Service: Gastroenterology;  Laterality: N/A;  Duodenitits; Gastritis; gastric polyp    UPPER GASTROINTESTINAL ENDOSCOPY  2/22/2024         FAMILY HISTORY  Family History   Problem Relation Age of Onset    Heart disease Father     Heart attack Father     Diabetes Father     Hypertension Father     Breast cancer Maternal Grandmother     Colon cancer Maternal Grandmother     Endometrial cancer Maternal Grandmother     Cancer Maternal Grandmother     Diverticulitis Mother     Heart disease Mother     Hypertension Mother     Colon polyps Mother     Cancer Maternal Grandfather     Cancer Paternal Grandfather     Malig Hyperthermia Neg Hx          SOCIAL HISTORY  Social History     Occupational History    Not on file   Tobacco Use    Smoking status: Never     Passive exposure: Never    Smokeless tobacco: Never   Vaping Use    Vaping status: Never Used   Substance and Sexual Activity    Alcohol use: No    Drug use: No    Sexual activity: Yes     Partners: Male     Birth control/protection: None         CURRENT MEDICATIONS    Current Outpatient Medications:     omeprazole (priLOSEC) 40 MG capsule, Take 1 capsule by mouth Daily., Disp: 90 capsule, Rfl: 0    ondansetron ODT (ZOFRAN-ODT) 4 MG disintegrating tablet, Place 1 tablet on the tongue Every 8 (Eight) Hours As Needed for Nausea or Vomiting., Disp: 30 tablet, Rfl: 0    polyethylene glycol (MiraLax) 17 GM/SCOOP powder, Take 17 g by mouth Every Morning., Disp: , Rfl:     sertraline (ZOLOFT) 100 MG tablet, Take 1 tablet by mouth Daily., Disp: 90 tablet, Rfl: 1    SUMAtriptan  "(Imitrex) 100 MG tablet, Take one tablet by mouth at onset of headache. May repeat dose one time in 2 hours if headache not relieved., Disp: 16 tablet, Rfl: 6    valACYclovir (Valtrex) 500 MG tablet, Take 1 tablet twice a day for 3-5 days at first sign of fever blister., Disp: 30 tablet, Rfl: 1    Wheat Dextrin (BENEFIBER PO), Take  by mouth Daily., Disp: , Rfl:     sucralfate (Carafate) 1 g tablet, Take 1 tablet by mouth 4 (Four) Times a Day Before Meals & at Bedtime As Needed (Heart burn, indigestion)., Disp: 90 tablet, Rfl: 3    ALLERGIES  Patient has no known allergies.    VITALS  Vitals:    04/04/25 1011   BP: 110/82   BP Location: Left arm   Patient Position: Sitting   Cuff Size: Adult   Weight: 75.9 kg (167 lb 6.4 oz)   Height: 163.8 cm (64.5\")       PHYSICAL EXAM  Debilities/Disabilities Identified: None  Emotional Behavior: Appropriate  Wt Readings from Last 3 Encounters:   04/04/25 75.9 kg (167 lb 6.4 oz)   07/05/24 72.4 kg (159 lb 9.6 oz)   04/04/24 71.6 kg (157 lb 12.8 oz)     Ht Readings from Last 1 Encounters:   04/04/25 163.8 cm (64.5\")     Body mass index is 28.29 kg/m².  Physical Exam  Constitutional:       General: She is not in acute distress.     Appearance: Normal appearance. She is not ill-appearing.   HENT:      Head: Normocephalic and atraumatic.      Mouth/Throat:      Mouth: Mucous membranes are moist.      Pharynx: No posterior oropharyngeal erythema.   Eyes:      General: No scleral icterus.  Cardiovascular:      Rate and Rhythm: Normal rate and regular rhythm.      Heart sounds: Normal heart sounds.   Pulmonary:      Effort: Pulmonary effort is normal.      Breath sounds: Normal breath sounds.   Abdominal:      General: Abdomen is flat. Bowel sounds are normal. There is no distension.      Palpations: Abdomen is soft. There is no mass.      Tenderness: There is no abdominal tenderness. There is no guarding or rebound. Negative signs include Yanez's sign.      Hernia: No hernia is " present.   Musculoskeletal:      Cervical back: Neck supple.   Skin:     General: Skin is warm.      Capillary Refill: Capillary refill takes less than 2 seconds.   Neurological:      General: No focal deficit present.      Mental Status: She is alert and oriented to person, place, and time.   Psychiatric:         Mood and Affect: Mood normal.         Behavior: Behavior normal.         Thought Content: Thought content normal.         Judgment: Judgment normal.         CLINICAL DATA REVIEWED   reviewed previous lab results and integrated with today's visit, reviewed notes from other physicians and/or last GI encounter, reviewed previous endoscopy results and available photos, reviewed surgical pathology results from previous biopsies    ASSESSMENT  Diagnoses and all orders for this visit:    Irritable bowel syndrome with constipation    Chronic superficial gastritis without bleeding    Other orders  -     polyethylene glycol (MiraLax) 17 GM/SCOOP powder; Take 17 g by mouth Every Morning.  -     Wheat Dextrin (BENEFIBER PO); Take  by mouth Daily.  -     sucralfate (Carafate) 1 g tablet; Take 1 tablet by mouth 4 (Four) Times a Day Before Meals & at Bedtime As Needed (Heart burn, indigestion).  -     omeprazole (priLOSEC) 40 MG capsule; Take 1 capsule by mouth Daily.          PLAN    IBS-C: Well controlled with fiber and miralax  Gastritis: Pretreat triggers with sucralfate, call if loss of control or increased frequency, otherwise will continue management as is as she is generally well controlled    Return in about 1 year (around 4/4/2026).    I have discussed the above plan with the patient.  They verbalize understanding and are in agreement with the plan.  They have been advised to contact the office for any questions, concerns, or changes related to their health.

## 2025-07-18 ENCOUNTER — OFFICE VISIT (OUTPATIENT)
Dept: INTERNAL MEDICINE | Facility: CLINIC | Age: 35
End: 2025-07-18
Payer: COMMERCIAL

## 2025-07-18 VITALS
DIASTOLIC BLOOD PRESSURE: 82 MMHG | RESPIRATION RATE: 16 BRPM | WEIGHT: 171.2 LBS | OXYGEN SATURATION: 97 % | HEIGHT: 63 IN | TEMPERATURE: 98.6 F | HEART RATE: 76 BPM | BODY MASS INDEX: 30.33 KG/M2 | SYSTOLIC BLOOD PRESSURE: 122 MMHG

## 2025-07-18 DIAGNOSIS — Z00.00 ANNUAL PHYSICAL EXAM: Primary | ICD-10-CM

## 2025-07-18 PROCEDURE — 99395 PREV VISIT EST AGE 18-39: CPT | Performed by: INTERNAL MEDICINE

## 2025-07-18 NOTE — PROGRESS NOTES
"Chief Complaint  Annual, Anxiety    Subjective        Taryn Terry presents to [facility]  History of Present Illness    Doing well and trying to implement lifestyle change.     Objective   Vital Signs:  /82 (BP Location: Left arm, Patient Position: Sitting, Cuff Size: Adult)   Pulse 76   Temp 98.6 °F (37 °C) (Infrared)   Resp 16   Ht 160 cm (63\")   Wt 77.7 kg (171 lb 3.2 oz)   SpO2 97%   BMI 30.33 kg/m²   Estimated body mass index is 30.33 kg/m² as calculated from the following:    Height as of this encounter: 160 cm (63\").    Weight as of this encounter: 77.7 kg (171 lb 3.2 oz).    BMI is >= 30 and <35. (Class 1 Obesity). The following options were offered after discussion;: exercise counseling/recommendations and nutrition counseling/recommendations.  BMI also is not accurate for all patients.       Physical Exam  Vitals reviewed.   Constitutional:       General: She is not in acute distress.     Appearance: Normal appearance.   HENT:      Head: Normocephalic and atraumatic.      Nose: Nose normal.      Mouth/Throat:      Mouth: Mucous membranes are moist.   Eyes:      Conjunctiva/sclera: Conjunctivae normal.   Cardiovascular:      Rate and Rhythm: Normal rate and regular rhythm.      Pulses: Normal pulses.      Heart sounds: Normal heart sounds.   Pulmonary:      Effort: Pulmonary effort is normal.      Breath sounds: Normal breath sounds.   Abdominal:      Palpations: Abdomen is soft.      Tenderness: There is no abdominal tenderness.   Musculoskeletal:      Right lower leg: No edema.      Left lower leg: No edema.   Skin:     General: Skin is warm and dry.   Neurological:      General: No focal deficit present.      Mental Status: She is alert.   Psychiatric:         Mood and Affect: Mood normal.        Result Review :                Assessment and Plan   Diagnoses and all orders for this visit:    1. Annual physical exam (Primary)        Reviewed all pertinent " vaccines for age and discussed healthy weight, exercise.  Patient will be screened with above labs and had physical exam and history taken.  Discussed ways to improve ASCVD health and general mental/physical health.            Follow Up   Return in about 6 months (around 1/18/2026) for f/u mental health.  Patient was given instructions and counseling regarding her condition or for health maintenance advice. Please see specific information pulled into the AVS if appropriate.              <-- Click to add NO pertinent Past Medical History

## (undated) DEVICE — SUT ETHIB 0/0 MO6 I8IN CX45D

## (undated) DEVICE — GLV SURG EUDERMIC PF LTX 8 STRL

## (undated) DEVICE — ENDOPOUCH RETRIEVER SPECIMEN RETRIEVAL BAGS: Brand: ENDOPOUCH RETRIEVER

## (undated) DEVICE — LAPAROSCOPIC TROCAR SLEEVE/SINGLE USE: Brand: KII® OPTICAL ACCESS SYSTEM

## (undated) DEVICE — METZENBAUM SCISSOR TIP, DISPOSABLE: Brand: RENEW

## (undated) DEVICE — PK LAP GEN 90

## (undated) DEVICE — SUCTION CANISTER, 1000CC,SAFELINER: Brand: DEROYAL

## (undated) DEVICE — 2, DISPOSABLE SUCTION/IRRIGATOR WITH DISPOSABLE TIP: Brand: STRYKEFLOW

## (undated) DEVICE — SNAR POLYP CAPTIVATOR/COLD STFF RND 10MM 240CM

## (undated) DEVICE — TROCAR: Brand: KII® SLEEVE

## (undated) DEVICE — SUT MNCRYL 4/0 PS2 18 IN

## (undated) DEVICE — VIAL FORMALIN CAP 10P 40ML

## (undated) DEVICE — KT ORCA ORCAPOD DISP STRL

## (undated) DEVICE — THE BITE BLOCK MAXI, LATEX FREE STRAP IS USED TO PROTECT THE ENDOSCOPE INSERTION TUBE FROM BEING BITTEN BY THE PATIENT.

## (undated) DEVICE — ADAPT CLN BIOGUARD AIR/H2O DISP

## (undated) DEVICE — GOWN,PREVENTION PLUS,XXLARGE,STERILE: Brand: MEDLINE

## (undated) DEVICE — Device

## (undated) DEVICE — ENDOPATH XCEL BLUNT TIP TROCARS WITH SMOOTH SLEEVES: Brand: ENDOPATH XCEL

## (undated) DEVICE — BNDG ADHS CURAD FLX/FABRC 2X4IN STRL LF

## (undated) DEVICE — BNDG ADHS PLSTC 1X3IN LF

## (undated) DEVICE — TBG INSUFL W FLTR STRL

## (undated) DEVICE — BW-412T DISP COMBO CLEANING BRUSH: Brand: SINGLE USE COMBINATION CLEANING BRUSH

## (undated) DEVICE — THE SINGLE USE ETRAP – POLYP TRAP IS USED FOR SUCTION RETRIEVAL OF ENDOSCOPICALLY REMOVED POLYPS.: Brand: ETRAP

## (undated) DEVICE — LINER SURG CANSTR SXN S/RIGD 1500CC

## (undated) DEVICE — SOL IRR H2O BTL 1000ML STRL

## (undated) DEVICE — SYR LL W/SCALE/MARK 3ML STRL